# Patient Record
Sex: FEMALE | NOT HISPANIC OR LATINO | Employment: FULL TIME | URBAN - NONMETROPOLITAN AREA
[De-identification: names, ages, dates, MRNs, and addresses within clinical notes are randomized per-mention and may not be internally consistent; named-entity substitution may affect disease eponyms.]

---

## 2023-03-20 DIAGNOSIS — M79.7 FIBROMYALGIA: ICD-10-CM

## 2023-03-20 RX ORDER — DULOXETIN HYDROCHLORIDE 30 MG/1
30 CAPSULE, DELAYED RELEASE ORAL DAILY
Qty: 30 CAPSULE | Refills: 11 | Status: SHIPPED | OUTPATIENT
Start: 2023-03-20 | End: 2023-04-13

## 2023-03-20 RX ORDER — DULOXETIN HYDROCHLORIDE 60 MG/1
60 CAPSULE, DELAYED RELEASE ORAL DAILY
Qty: 30 CAPSULE | Refills: 11 | Status: SHIPPED | OUTPATIENT
Start: 2023-03-20 | End: 2023-04-13

## 2023-03-20 RX ORDER — BUPROPION HYDROCHLORIDE 300 MG/1
1 TABLET ORAL DAILY
COMMUNITY
End: 2023-03-20 | Stop reason: SDUPTHER

## 2023-03-20 RX ORDER — BUPROPION HYDROCHLORIDE 300 MG/1
300 TABLET ORAL DAILY
Qty: 30 TABLET | Refills: 11 | Status: SHIPPED | OUTPATIENT
Start: 2023-03-20 | End: 2023-04-13

## 2023-03-20 RX ORDER — DULOXETIN HYDROCHLORIDE 30 MG/1
30 CAPSULE, DELAYED RELEASE ORAL DAILY
COMMUNITY
End: 2023-03-20 | Stop reason: SDUPTHER

## 2023-03-20 RX ORDER — DULOXETIN HYDROCHLORIDE 60 MG/1
1 CAPSULE, DELAYED RELEASE ORAL DAILY
COMMUNITY
Start: 2022-01-17 | End: 2023-03-20 | Stop reason: SDUPTHER

## 2023-03-27 ENCOUNTER — OFFICE VISIT (OUTPATIENT)
Dept: PRIMARY CARE | Facility: CLINIC | Age: 38
End: 2023-03-27
Payer: COMMERCIAL

## 2023-03-27 VITALS
HEART RATE: 72 BPM | WEIGHT: 157 LBS | BODY MASS INDEX: 23.79 KG/M2 | SYSTOLIC BLOOD PRESSURE: 98 MMHG | DIASTOLIC BLOOD PRESSURE: 64 MMHG | HEIGHT: 68 IN

## 2023-03-27 DIAGNOSIS — R51.9 SINUS HEADACHE: Primary | ICD-10-CM

## 2023-03-27 DIAGNOSIS — G43.009 MIGRAINE WITHOUT AURA AND WITHOUT STATUS MIGRAINOSUS, NOT INTRACTABLE: ICD-10-CM

## 2023-03-27 DIAGNOSIS — Z51.81 MEDICATION MONITORING ENCOUNTER: ICD-10-CM

## 2023-03-27 LAB
AMPHETAMINE (PRESENCE) IN URINE BY SCREEN METHOD: ABNORMAL
BARBITURATES PRESENCE IN URINE BY SCREEN METHOD: ABNORMAL
BENZODIAZEPINE (PRESENCE) IN URINE BY SCREEN METHOD: ABNORMAL
CANNABINOIDS IN URINE BY SCREEN METHOD: ABNORMAL
COCAINE (PRESENCE) IN URINE BY SCREEN METHOD: ABNORMAL
DRUG SCREEN COMMENT URINE: ABNORMAL
FENTANYL URINE: ABNORMAL
METHADONE (PRESENCE) IN URINE BY SCREEN METHOD: ABNORMAL
OPIATES (PRESENCE) IN URINE BY SCREEN METHOD: ABNORMAL
OXYCODONE (PRESENCE) IN URINE BY SCREEN METHOD: ABNORMAL
PHENCYCLIDINE (PRESENCE) IN URINE BY SCREEN METHOD: ABNORMAL

## 2023-03-27 PROCEDURE — 80307 DRUG TEST PRSMV CHEM ANLYZR: CPT

## 2023-03-27 PROCEDURE — 80349 CANNABINOIDS NATURAL: CPT

## 2023-03-27 PROCEDURE — 99214 OFFICE O/P EST MOD 30 MIN: CPT | Performed by: FAMILY MEDICINE

## 2023-03-27 PROCEDURE — 80324 DRUG SCREEN AMPHETAMINES 1/2: CPT

## 2023-03-27 PROCEDURE — 1036F TOBACCO NON-USER: CPT | Performed by: FAMILY MEDICINE

## 2023-03-27 RX ORDER — CETIRIZINE HYDROCHLORIDE 10 MG/1
10 TABLET ORAL 2 TIMES DAILY
Qty: 60 TABLET | Refills: 0 | Status: SHIPPED | OUTPATIENT
Start: 2023-03-27 | End: 2023-05-08

## 2023-03-27 RX ORDER — FLUTICASONE PROPIONATE 50 MCG
1 SPRAY, SUSPENSION (ML) NASAL DAILY
Qty: 16 G | Refills: 12 | Status: SHIPPED | OUTPATIENT
Start: 2023-03-27 | End: 2023-07-20 | Stop reason: ALTCHOICE

## 2023-03-27 RX ORDER — MONTELUKAST SODIUM 10 MG/1
10 TABLET ORAL NIGHTLY
Qty: 30 TABLET | Refills: 3 | Status: SHIPPED | OUTPATIENT
Start: 2023-03-27 | End: 2023-07-20 | Stop reason: ALTCHOICE

## 2023-03-27 RX ORDER — SUMATRIPTAN SUCCINATE 100 MG/1
TABLET ORAL
COMMUNITY
End: 2023-05-03 | Stop reason: SDUPTHER

## 2023-03-27 RX ORDER — BUTALBITAL, ACETAMINOPHEN AND CAFFEINE 50; 325; 40 MG/1; MG/1; MG/1
1 TABLET ORAL EVERY 6 HOURS PRN
Qty: 20 TABLET | Refills: 0 | Status: SHIPPED | OUTPATIENT
Start: 2023-03-27 | End: 2023-04-01

## 2023-03-27 ASSESSMENT — PATIENT HEALTH QUESTIONNAIRE - PHQ9
8. MOVING OR SPEAKING SO SLOWLY THAT OTHER PEOPLE COULD HAVE NOTICED. OR THE OPPOSITE, BEING SO FIGETY OR RESTLESS THAT YOU HAVE BEEN MOVING AROUND A LOT MORE THAN USUAL: NEARLY EVERY DAY
6. FEELING BAD ABOUT YOURSELF - OR THAT YOU ARE A FAILURE OR HAVE LET YOURSELF OR YOUR FAMILY DOWN: NEARLY EVERY DAY
4. FEELING TIRED OR HAVING LITTLE ENERGY: NEARLY EVERY DAY
SUM OF ALL RESPONSES TO PHQ QUESTIONS 1-9: 21
SUM OF ALL RESPONSES TO PHQ9 QUESTIONS 1 AND 2: 6
5. POOR APPETITE OR OVEREATING: NEARLY EVERY DAY
7. TROUBLE CONCENTRATING ON THINGS, SUCH AS READING THE NEWSPAPER OR WATCHING TELEVISION: NEARLY EVERY DAY
9. THOUGHTS THAT YOU WOULD BE BETTER OFF DEAD, OR OF HURTING YOURSELF: NOT AT ALL
1. LITTLE INTEREST OR PLEASURE IN DOING THINGS: NEARLY EVERY DAY
2. FEELING DOWN, DEPRESSED OR HOPELESS: NEARLY EVERY DAY
10. IF YOU CHECKED OFF ANY PROBLEMS, HOW DIFFICULT HAVE THESE PROBLEMS MADE IT FOR YOU TO DO YOUR WORK, TAKE CARE OF THINGS AT HOME, OR GET ALONG WITH OTHER PEOPLE: EXTREMELY DIFFICULT
3. TROUBLE FALLING OR STAYING ASLEEP OR SLEEPING TOO MUCH: NOT AT ALL

## 2023-03-27 ASSESSMENT — ENCOUNTER SYMPTOMS
FEVER: 0
WHEEZING: 0
SINUS PRESSURE: 1
SHORTNESS OF BREATH: 1
COUGH: 1
SORE THROAT: 1
SINUS PAIN: 1
CHILLS: 1

## 2023-03-27 NOTE — PROGRESS NOTES
Subjective   Patient ID: Nuzhat Morris is a 37 y.o. female who presents for Sore Throat and Migraine.    HPI   ST went away 2 months ago   Cough x 3 days productive yellow   FARRELL will not quit    Now a migraine       X 2 days took imitrex helped a little bit  Sumatriptan takes half at time      9 per month and states not enough     Has seen neuology in [place]         no visual aura       But states has usual      Lightheadedness      Some photophobia      Some Phonophobia     Taking a decongestant feels like is making worse   In the last 2 days is progressive worse    No nose spray zyrtec for  3 days   Nasal congestion clear to yellow   Runny nose dry burning iritation  Eyes burning   Had pain bitemporal and sinus pressure   Benadryl 25 2 po at bedtime and makes sleepy   Zyrtec D but none today     Fioricet takes prn and last dose was 2 years ago     OARRS:  No data recorded  I have personally reviewed the OARRS report for Nuzhat Morris. I have considered the risks of abuse, dependence, addiction and diversion    Is the patient prescribed a combination of a benzodiazepine and opioid?  No    Last Urine Drug Screen / ordered today: Yes  Recent Results (from the past 03173 hour(s))   DRUG SCREEN,URINE    Collection Time: 08/29/21  6:44 PM   Result Value Ref Range    DRUG SCREEN COMMENT URINE SEE BELOW     Amphetamine Screen, Urine PRESUMPTIVE POSITIVE (A) NEGATIVE    Barbiturate Screen, Urine PRESUMPTIVE NEGATIVE NEGATIVE    BENZODIAZEPINE (PRESENCE) IN URINE BY SCREEN METHOD PRESUMPTIVE POSITIVE (A) NEGATIVE    Cannabinoid Screen, Urine PRESUMPTIVE POSITIVE (A) NEGATIVE    Cocaine Screen, Urine PRESUMPTIVE NEGATIVE NEGATIVE    Fentanyl, Ur PRESUMPTIVE NEGATIVE NEGATIVE    Methadone Screen, Urine PRESUMPTIVE NEGATIVE NEGATIVE    Opiate Screen, Urine PRESUMPTIVE NEGATIVE NEGATIVE    Oxycodone Screen, Ur PRESUMPTIVE NEGATIVE NEGATIVE    PCP Screen, Urine PRESUMPTIVE NEGATIVE NEGATIVE     Results are as  "expected.     Controlled Substance Agreement:  Date of the Last Agreement: 3/27/23  Reviewed Controlled Substance Agreement including but not limited to the benefits, risks, and alternatives to treatment with a Controlled Substance medication(s).    Barbiturates:   What is the patient's goal of therapy? To control HA   Is this being achieved with current treatment? Has not taken in 2 years   But did help     Activities of Daily Living:   Is your overall impression that this patient is benefiting (symptom reduction outweighs side effects) from barbiturate therapy? Yes     Explaine barbituates not drug of choice for migraines and will only give a limited amount and refer to neurology      Review of Systems   Constitutional:  Positive for chills. Negative for fever.   HENT:  Positive for sinus pressure, sinus pain, sneezing and sore throat.    Respiratory:  Positive for cough and shortness of breath. Negative for wheezing.        Objective   BP 98/64   Pulse 72   Ht 1.727 m (5' 8\")   Wt 71.2 kg (157 lb)   BMI 23.87 kg/m²     Physical Exam  Constitutional:       Appearance: Normal appearance. She is normal weight.   HENT:      Head: Normocephalic and atraumatic.      Right Ear: Tympanic membrane, ear canal and external ear normal. There is no impacted cerumen.      Left Ear: Tympanic membrane, ear canal and external ear normal. There is no impacted cerumen.      Nose: Congestion and rhinorrhea present.      Mouth/Throat:      Mouth: Mucous membranes are moist.      Pharynx: Oropharynx is clear. No oropharyngeal exudate or posterior oropharyngeal erythema.   Eyes:      Extraocular Movements: Extraocular movements intact.      Conjunctiva/sclera: Conjunctivae normal.      Pupils: Pupils are equal, round, and reactive to light.   Pulmonary:      Effort: Pulmonary effort is normal.      Breath sounds: Normal breath sounds.   Neurological:      General: No focal deficit present.      Mental Status: She is alert and " oriented to person, place, and time. Mental status is at baseline.   Psychiatric:         Mood and Affect: Mood normal.         Behavior: Behavior normal.         Assessment/Plan   Problem List Items Addressed This Visit    None  Visit Diagnoses       Sinus headache    -  Primary    Relevant Medications    butalbital-acetaminophen-caff -40 mg tablet    montelukast (Singulair) 10 mg tablet    cetirizine (ZyrTEC) 10 mg tablet    fluticasone (Flonase) 50 mcg/actuation nasal spray    Migraine without aura and without status migrainosus, not intractable        Relevant Medications    butalbital-acetaminophen-caff -40 mg tablet    Other Relevant Orders    Referral to Neurology    Medication monitoring encounter        Relevant Orders    Drug Screen, Urine With Reflex to Confirmation          I feel her HA is more sinus related and will treat as such however she is using her imitrex every month and needs a clear indication for when to use imitrex.

## 2023-03-31 LAB
11-NOR-9-CARBOXY-THC, URN, QUANT: >500 NG/ML
AMPHETAMINES,URINE: <50 NG/ML
MDA,URINE: <200 NG/ML
MDEA,URINE: <200 NG/ML
MDMA,UR: <200 NG/ML
METHAMPHETAMINE QUANTITATIVE URINE: <200 NG/ML
PHENTERMINE,UR: <200 NG/ML

## 2023-04-05 ENCOUNTER — TELEPHONE (OUTPATIENT)
Dept: PRIMARY CARE | Facility: CLINIC | Age: 38
End: 2023-04-05
Payer: COMMERCIAL

## 2023-04-05 DIAGNOSIS — R51.9 SINUS HEADACHE: Primary | ICD-10-CM

## 2023-04-05 NOTE — TELEPHONE ENCOUNTER
Stating that the medication that she has been taking for her allergies is not effective, please advise

## 2023-04-12 DIAGNOSIS — M79.7 FIBROMYALGIA: ICD-10-CM

## 2023-04-13 RX ORDER — BUPROPION HYDROCHLORIDE 300 MG/1
300 TABLET ORAL DAILY
Qty: 30 TABLET | Refills: 4 | Status: SHIPPED | OUTPATIENT
Start: 2023-04-13 | End: 2024-04-19 | Stop reason: SDUPTHER

## 2023-04-13 RX ORDER — DULOXETIN HYDROCHLORIDE 30 MG/1
CAPSULE, DELAYED RELEASE ORAL
Qty: 30 CAPSULE | Refills: 0 | Status: SHIPPED | OUTPATIENT
Start: 2023-04-13 | End: 2023-04-27

## 2023-04-13 RX ORDER — DULOXETIN HYDROCHLORIDE 60 MG/1
CAPSULE, DELAYED RELEASE ORAL
Qty: 30 CAPSULE | Refills: 11 | Status: SHIPPED | OUTPATIENT
Start: 2023-04-13 | End: 2024-01-03 | Stop reason: WASHOUT

## 2023-04-21 ENCOUNTER — APPOINTMENT (OUTPATIENT)
Dept: PRIMARY CARE | Facility: CLINIC | Age: 38
End: 2023-04-21
Payer: COMMERCIAL

## 2023-04-27 DIAGNOSIS — M79.7 FIBROMYALGIA: ICD-10-CM

## 2023-04-27 RX ORDER — DULOXETIN HYDROCHLORIDE 30 MG/1
CAPSULE, DELAYED RELEASE ORAL
Qty: 90 CAPSULE | Refills: 1 | Status: SHIPPED | OUTPATIENT
Start: 2023-04-27 | End: 2023-07-20 | Stop reason: ALTCHOICE

## 2023-04-27 NOTE — TELEPHONE ENCOUNTER
Called in asking for refills on Gabapentin and one other medication but she did not tell me what it was. Send to [facility] in [place] thank you

## 2023-05-03 ENCOUNTER — TELEPHONE (OUTPATIENT)
Dept: PRIMARY CARE | Facility: CLINIC | Age: 38
End: 2023-05-03
Payer: COMMERCIAL

## 2023-05-03 DIAGNOSIS — G43.009 MIGRAINE WITHOUT AURA AND WITHOUT STATUS MIGRAINOSUS, NOT INTRACTABLE: ICD-10-CM

## 2023-05-03 DIAGNOSIS — M79.7 FIBROMYALGIA: ICD-10-CM

## 2023-05-03 RX ORDER — GABAPENTIN 300 MG/1
CAPSULE ORAL
COMMUNITY
End: 2023-05-03 | Stop reason: SDUPTHER

## 2023-05-03 RX ORDER — GABAPENTIN 300 MG/1
300 CAPSULE ORAL 4 TIMES DAILY
Qty: 120 CAPSULE | Refills: 11 | Status: SHIPPED | OUTPATIENT
Start: 2023-05-03 | End: 2023-07-20

## 2023-05-03 RX ORDER — SUMATRIPTAN SUCCINATE 100 MG/1
TABLET ORAL
Qty: 9 TABLET | Refills: 3 | Status: SHIPPED | OUTPATIENT
Start: 2023-05-03 | End: 2023-10-24 | Stop reason: SDUPTHER

## 2023-05-08 DIAGNOSIS — R51.9 SINUS HEADACHE: ICD-10-CM

## 2023-05-08 RX ORDER — CETIRIZINE HYDROCHLORIDE 10 MG/1
TABLET ORAL
Qty: 60 TABLET | Refills: 0 | Status: SHIPPED | OUTPATIENT
Start: 2023-05-08 | End: 2023-07-20 | Stop reason: ALTCHOICE

## 2023-05-24 RX ORDER — BUSPIRONE HYDROCHLORIDE 15 MG/1
TABLET ORAL
Qty: 90 TABLET | Refills: 1 | OUTPATIENT
Start: 2023-05-24

## 2023-06-29 RX ORDER — PRAZOSIN HYDROCHLORIDE 2 MG/1
4 CAPSULE ORAL NIGHTLY
Qty: 60 CAPSULE | Refills: 11 | Status: CANCELLED | OUTPATIENT
Start: 2023-06-29 | End: 2024-06-28

## 2023-06-29 RX ORDER — PRAZOSIN HYDROCHLORIDE 2 MG/1
2 CAPSULE ORAL NIGHTLY
COMMUNITY
Start: 2019-01-10 | End: 2023-07-20 | Stop reason: ALTCHOICE

## 2023-06-29 NOTE — TELEPHONE ENCOUNTER
I don't see that we have rx this before.  Ask when she last took it and who rx it.  Why is she taking it.?

## 2023-07-06 ENCOUNTER — TELEPHONE (OUTPATIENT)
Dept: PRIMARY CARE | Facility: CLINIC | Age: 38
End: 2023-07-06
Payer: COMMERCIAL

## 2023-07-06 NOTE — TELEPHONE ENCOUNTER
She got the message about not passing the drug test. She has a medical marijuana license and the medication she is requesting was filled in March. Bringing that up or testing her for that is discrimination. Medical marijuana is not a drug. Would like to switch doctors immately. It's discrimination you can't tell her she can't smoke weed. She has anxiety issues and Dr. Hackett treats it. Asking for a call back to switch doctors.     Stating when she had the drug test in March it did show marijuana and a doctor prescribed it. Withholding medication from her that has already been prescribed is not an ethical practice and she is going report it to the state medical board. She filled out the form and is going to file a complaint.

## 2023-07-20 ENCOUNTER — OFFICE VISIT (OUTPATIENT)
Dept: PRIMARY CARE | Facility: CLINIC | Age: 38
End: 2023-07-20
Payer: COMMERCIAL

## 2023-07-20 VITALS
WEIGHT: 152.5 LBS | SYSTOLIC BLOOD PRESSURE: 119 MMHG | OXYGEN SATURATION: 98 % | DIASTOLIC BLOOD PRESSURE: 76 MMHG | HEART RATE: 98 BPM | BODY MASS INDEX: 23.11 KG/M2 | TEMPERATURE: 98 F | HEIGHT: 68 IN

## 2023-07-20 DIAGNOSIS — N30.10 INTERSTITIAL CYSTITIS: ICD-10-CM

## 2023-07-20 DIAGNOSIS — G43.009 MIGRAINE WITHOUT AURA AND WITHOUT STATUS MIGRAINOSUS, NOT INTRACTABLE: ICD-10-CM

## 2023-07-20 DIAGNOSIS — F41.0 PANIC ATTACK: ICD-10-CM

## 2023-07-20 DIAGNOSIS — F51.04 PSYCHOPHYSIOLOGICAL INSOMNIA: ICD-10-CM

## 2023-07-20 DIAGNOSIS — F41.8 MIXED ANXIETY AND DEPRESSIVE DISORDER: Primary | ICD-10-CM

## 2023-07-20 DIAGNOSIS — M79.7 FIBROMYALGIA: ICD-10-CM

## 2023-07-20 PROCEDURE — 99214 OFFICE O/P EST MOD 30 MIN: CPT | Performed by: PHYSICIAN ASSISTANT

## 2023-07-20 PROCEDURE — 1036F TOBACCO NON-USER: CPT | Performed by: PHYSICIAN ASSISTANT

## 2023-07-20 RX ORDER — GABAPENTIN 600 MG/1
600 TABLET ORAL 3 TIMES DAILY
Qty: 90 TABLET | Refills: 11 | Status: SHIPPED | OUTPATIENT
Start: 2023-07-20 | End: 2024-01-03 | Stop reason: ALTCHOICE

## 2023-07-20 RX ORDER — TRAZODONE HYDROCHLORIDE 50 MG/1
50 TABLET ORAL NIGHTLY PRN
Qty: 30 TABLET | Refills: 5 | Status: SHIPPED | OUTPATIENT
Start: 2023-07-20 | End: 2023-08-14

## 2023-07-20 RX ORDER — METHENAMINE, BENZOIC ACID, PHENYL SALICYLATE, METHYLENE BLUE, AND HYOSCYAMINE SULFATE 81.6; 9; 36.2; 10.8; .12 MG/1; MG/1; MG/1; MG/1; MG/1
TABLET ORAL
COMMUNITY
End: 2023-09-06 | Stop reason: SDUPTHER

## 2023-07-20 RX ORDER — BUTALBITAL, ACETAMINOPHEN AND CAFFEINE 300; 40; 50 MG/1; MG/1; MG/1
1 CAPSULE ORAL EVERY 4 HOURS PRN
Qty: 30 CAPSULE | Refills: 1 | Status: SHIPPED | OUTPATIENT
Start: 2023-07-20 | End: 2023-10-24 | Stop reason: SDUPTHER

## 2023-07-20 RX ORDER — LORAZEPAM 1 MG/1
1 TABLET ORAL EVERY 8 HOURS PRN
Qty: 21 TABLET | Refills: 0 | Status: SHIPPED | OUTPATIENT
Start: 2023-07-20 | End: 2023-08-28 | Stop reason: SDUPTHER

## 2023-07-20 RX ORDER — BUTALBITAL, ACETAMINOPHEN AND CAFFEINE 300; 40; 50 MG/1; MG/1; MG/1
1 CAPSULE ORAL EVERY 4 HOURS PRN
COMMUNITY
Start: 2019-02-04 | End: 2023-07-20 | Stop reason: SDUPTHER

## 2023-07-20 ASSESSMENT — PATIENT HEALTH QUESTIONNAIRE - PHQ9
2. FEELING DOWN, DEPRESSED OR HOPELESS: NEARLY EVERY DAY
SUM OF ALL RESPONSES TO PHQ9 QUESTIONS 1 AND 2: 6
1. LITTLE INTEREST OR PLEASURE IN DOING THINGS: NEARLY EVERY DAY

## 2023-07-20 NOTE — PROGRESS NOTES
Subjective   Patient ID: Nuzhat Quinn is a 38 y.o. female who presents for Establish Care (Transferring from [facility] and last seen by PCP x 1 month./PAP done 2019.), Panic Attack (Patient having episodes of panic attacks that have been waking her up from her sleep at night and has had to go to ER at one time.), and Fibromyalgia (Patient would like to discuss increasing Gabapentin as she has been having increase discomfort of shoulders, knees, elbows and lower back.).  HPI  Patient presents to \[facility]\"" care.    Patient has medical history of fibromyalgia, interstitial cystitis, anxiety and depression, panic attacks, insomnia, migraines.  Currently, the patient takes Imitrex as needed, Hyophen, gabapentin, Cymbalta, medical marijuana (patient has a card), and Wellbutrin in management of these.    The patient is going through an extended and complicated divorce.  Patient reports increase in anxiety and panic attacks.  Patient reports waking up in the night sweating with tachycardia, chest pressure, and great fear.  Patient reports lying on the floor in the bathroom crying trying to muscle through these.  Patient was seen and treated in the ER not long ago for the same.  Patient states that the panic attacks have become more frequent recently but can go months without occurring.  Patient is having difficulty falling asleep in fear that the panic attack will occur in the night causing her to wake up in great fear.  Additionally, the patient reports increase in extremity and low back pain with intermittent spasms.  The patient is requesting possible increase in gabapentin and a PRN panic attack option.  The patient has been taking the same dose of gabapentin since approximately 2017 after being diagnosed with fibromyalgia by previous primary care.    Acutely, patient has no other complaints.  Patient needs refill on the Fioricet.  Review of Systems    Constitutional:  See HPI   Eye:  No recent  "visual problem.    Respiratory:  No shortness of breath, No cough.    Cardiovascular:  No chest pain.    Gastrointestinal:  No abdominal pain, No nausea, No vomiting.    Genitourinary:  No dysuria, No hematuria.    Musculoskeletal:  No decreased range of motion.    Integumentary:  No rash.    Neurologic:  Alert and oriented X4, No numbness, No tingling.    All other systems are negative     Objective     /76   Pulse 98   Temp 36.7 °C (98 °F) (Temporal)   Ht 1.727 m (5' 8\")   Wt 69.2 kg (152 lb 8 oz)   SpO2 98%   BMI 23.19 kg/m²     Physical Exam    General:  Alert and oriented, No acute distress.    Eye:  Pupils are equal, round and reactive to light, Extraocular movements are intact, Normal conjunctiva.    HENT:  Normocephalic, Normal hearing, Oral mucosa is moist, No pharyngeal erythema, No sinus tenderness.    Neck:  Supple, Non-tender, No lymphadenopathy.    Respiratory:  Lungs are clear to auscultation, Respirations are non-labored, Breath sounds are equal    Cardiovascular:  Normal rate, Regular rhythm.    Gastrointestinal:  Non-distended.    Musculoskeletal: Normal range of motion, Normal strength, No tenderness, No swelling, No deformity, Normal gait.     Integumentary:  Warm, Dry, Intact, No pallor, No rash.    Neurologic:  Alert, Oriented, Normal sensory, Normal motor function, No focal deficits, Cranial Nerves II-XII are grossly intact   Psychiatric:  Cooperative, Appropriate mood & affect.    Assessment/Plan   Anxiety and depression with panic attacks: Continue Cymbalta and Wellbutrin.  Added short course of Ativan as needed.  OARRS reviewed, appropriate, and consitent with provided history.  Patient advised that should Ativan be needed moving forward, we will need to obtain UDS and CSA.  Also, the addition of trazodone for insomnia will help with this.    Insomnia: Trazodone nightly as needed insomnia    Migraines: Continue Imitrex and Fioricet as needed, Fioricet refilled    Interstitial " cystitis: Continue Hyophen and following with urology as previously planned    Fibromyalgia: We will increase gabapentin to 600 mg 3 times daily.    Health maintenance: Screening labs ordered.  Further recommendations pending results.    Follow-up in 6 months or as needed.    Problem List Items Addressed This Visit    None  Visit Diagnoses       Mixed anxiety and depressive disorder    -  Primary    Relevant Medications    LORazepam (Ativan) 1 mg tablet    Other Relevant Orders    Basic Metabolic Panel    CBC    Lipid Panel    TSH with reflex to Free T4 if abnormal    Vitamin D, Total    Hepatic Function Panel    Panic attack        Relevant Medications    LORazepam (Ativan) 1 mg tablet    Other Relevant Orders    Basic Metabolic Panel    CBC    Lipid Panel    TSH with reflex to Free T4 if abnormal    Vitamin D, Total    Hepatic Function Panel    Psychophysiological insomnia        Relevant Medications    traZODone (Desyrel) 50 mg tablet    Other Relevant Orders    Basic Metabolic Panel    CBC    Lipid Panel    TSH with reflex to Free T4 if abnormal    Vitamin D, Total    Hepatic Function Panel    Migraine without aura and without status migrainosus, not intractable        Relevant Medications    butalbital-acetaminophen-caff (Fioricet) -40 mg capsule    Other Relevant Orders    Basic Metabolic Panel    CBC    Lipid Panel    TSH with reflex to Free T4 if abnormal    Vitamin D, Total    Hepatic Function Panel    Fibromyalgia        Relevant Medications    gabapentin (Neurontin) 600 mg tablet    Interstitial cystitis                Final diagnoses:   [F41.8] Mixed anxiety and depressive disorder   [F41.0] Panic attack   [F51.04] Psychophysiological insomnia   [G43.009] Migraine without aura and without status migrainosus, not intractable   [M79.7] Fibromyalgia   [N30.10] Interstitial cystitis

## 2023-08-11 DIAGNOSIS — F51.04 PSYCHOPHYSIOLOGICAL INSOMNIA: ICD-10-CM

## 2023-08-14 RX ORDER — TRAZODONE HYDROCHLORIDE 50 MG/1
50 TABLET ORAL NIGHTLY PRN
Qty: 30 TABLET | Refills: 5 | Status: SHIPPED | OUTPATIENT
Start: 2023-08-14 | End: 2024-01-03 | Stop reason: WASHOUT

## 2023-08-15 ENCOUNTER — APPOINTMENT (OUTPATIENT)
Dept: PRIMARY CARE | Facility: CLINIC | Age: 38
End: 2023-08-15
Payer: COMMERCIAL

## 2023-08-28 ENCOUNTER — OFFICE VISIT (OUTPATIENT)
Dept: PRIMARY CARE | Facility: CLINIC | Age: 38
End: 2023-08-28
Payer: COMMERCIAL

## 2023-08-28 VITALS
HEART RATE: 77 BPM | HEIGHT: 68 IN | WEIGHT: 154.5 LBS | SYSTOLIC BLOOD PRESSURE: 131 MMHG | TEMPERATURE: 98.7 F | BODY MASS INDEX: 23.42 KG/M2 | DIASTOLIC BLOOD PRESSURE: 82 MMHG

## 2023-08-28 DIAGNOSIS — F41.0 PANIC ATTACK: ICD-10-CM

## 2023-08-28 DIAGNOSIS — F41.8 MIXED ANXIETY AND DEPRESSIVE DISORDER: ICD-10-CM

## 2023-08-28 PROCEDURE — 99214 OFFICE O/P EST MOD 30 MIN: CPT | Performed by: PHYSICIAN ASSISTANT

## 2023-08-28 PROCEDURE — 1036F TOBACCO NON-USER: CPT | Performed by: PHYSICIAN ASSISTANT

## 2023-08-28 RX ORDER — MIRTAZAPINE 15 MG/1
TABLET, FILM COATED ORAL
Qty: 90 TABLET | Refills: 3 | Status: SHIPPED | OUTPATIENT
Start: 2023-08-28 | End: 2023-10-16 | Stop reason: ALTCHOICE

## 2023-08-28 RX ORDER — TRIMETHOPRIM 100 MG/1
TABLET ORAL
COMMUNITY
Start: 2022-12-02

## 2023-08-28 RX ORDER — LORAZEPAM 1 MG/1
1 TABLET ORAL EVERY 8 HOURS PRN
Qty: 30 TABLET | Refills: 2 | Status: SHIPPED | OUTPATIENT
Start: 2023-08-28 | End: 2023-10-24 | Stop reason: SDUPTHER

## 2023-08-28 RX ORDER — LORAZEPAM 1 MG/1
1 TABLET ORAL EVERY 8 HOURS PRN
Qty: 21 TABLET | Refills: 0 | Status: CANCELLED | OUTPATIENT
Start: 2023-08-28

## 2023-08-28 NOTE — PROGRESS NOTES
"Subjective   Patient ID: Nuzhat Delgado is a 38 y.o. female who presents for Leg Swelling (Lower extremity swelling x 3-4 days and states having facial swelling but has been crying. /), Abdominal Pain (Patient states was seen at [facility] ER in [place] x 2 weeks dx with ovary cyst.  Patient has had no follow up and states having left upper abdomen pain.), and Earache (Right ear discomfort x 2 days with drainage).  HPI    Patient presents in follow-up of anxiety and depression.  Patient has had persistent panic attacks since last visit.  Patient was in the ER for same not long ago.  Patient reports crying all day, panic attacks, and depression that is worsening.  Patient reports more complicated home life stressors related to family, custody of children.  Patient states that trazodone has worked for insomnia.    Patient reports on and off left otalgia for the past several weeks.  No preceding upper respiratory infection.  Patient denies known seasonal allergies.      Patient does continue to follow with [facility].    Review of Systems    Constitutional:  See HPI   Eye:  No recent visual problem.    ENT: See HPI  Psychiatric: See HPI.    Neurologic:  Alert and oriented X4, No numbness, No tingling.    All other systems are negative     Objective     /82   Pulse 77   Temp 37.1 °C (98.7 °F) (Temporal)   Ht 1.727 m (5' 8\")   Wt 70.1 kg (154 lb 8 oz)   BMI 23.49 kg/m²     Physical Exam    General:  Alert and oriented, No acute distress.    Eye:  Pupils are equal, round and reactive to light, Normal conjunctiva.    HENT:  Normocephalic, bilateral tympanic membranes and canals are unremarkable  Neck:  Supple    Respiratory: Respirations are non-labored   Musculoskeletal: Normal ROM and strength  Integumentary:  Warm, Dry, Intact, No pallor, No rash.    Neurologic:  Alert, Oriented, Normal sensory, Cranial Nerves II-XII are grossly intact  Psychiatric: Sad, tearful    Assessment/Plan "   Anxiety, depression, and panic attacks: Add Remeron 15 mg at upward titrating dose.  Ativan refilled for 1 month.  CSA signed and UDS obtained.  Follow-up 1 month patient is encouraged to follow-up anytime simply to talk, if needed.  Patient is ultimately alone.  Patient's family is scattered from [place] to [place].  Patient is in the midst of a divorce and custody castaneda.    Ear pain is likely from excessive crying and subsequent chronic congestion.  Recommend decongestant and/or Flonase.    ER results reviewed.  Patient has a history of physiologic ovarian cysts.      Problem List Items Addressed This Visit    None  Visit Diagnoses       Mixed anxiety and depressive disorder        Relevant Medications    mirtazapine (Remeron) 15 mg tablet    LORazepam (Ativan) 1 mg tablet    Panic attack        Relevant Medications    mirtazapine (Remeron) 15 mg tablet    LORazepam (Ativan) 1 mg tablet            Final diagnoses:   [F41.8] Mixed anxiety and depressive disorder   [F41.0] Panic attack

## 2023-09-06 ENCOUNTER — TELEPHONE (OUTPATIENT)
Dept: PRIMARY CARE | Facility: CLINIC | Age: 38
End: 2023-09-06
Payer: COMMERCIAL

## 2023-09-06 DIAGNOSIS — N30.10 INTERSTITIAL CYSTITIS: Primary | ICD-10-CM

## 2023-09-06 RX ORDER — URINARY ANTISEPTIC ANTISPASMODIC 81.6; 40.8; 10.8; .12 MG/1; MG/1; MG/1; MG/1
1 TABLET ORAL 4 TIMES DAILY PRN
Qty: 30 TABLET | Refills: 3 | Status: SHIPPED | OUTPATIENT
Start: 2023-09-06 | End: 2024-01-03 | Stop reason: WASHOUT

## 2023-09-06 RX ORDER — METHENAMINE, BENZOIC ACID, PHENYL SALICYLATE, METHYLENE BLUE, AND HYOSCYAMINE SULFATE 81.6; 9; 36.2; 10.8; .12 MG/1; MG/1; MG/1; MG/1; MG/1
1 TABLET ORAL 3 TIMES DAILY PRN
Qty: 90 TABLET | Refills: 3 | Status: SHIPPED | OUTPATIENT
Start: 2023-09-06

## 2023-09-06 NOTE — TELEPHONE ENCOUNTER
[facility] pharmacy called stating Hyophen is no longer available and replaced with generic Urogesic.   Please Escribe new prescription.

## 2023-09-11 ENCOUNTER — TELEPHONE (OUTPATIENT)
Dept: PRIMARY CARE | Facility: CLINIC | Age: 38
End: 2023-09-11
Payer: COMMERCIAL

## 2023-09-11 NOTE — TELEPHONE ENCOUNTER
Called patient, no answer, left message in voice mail stating the Spravato nasal spray is not medication he would write and he can refer her to psychiatry if that is something she is interested in, also he is still up-titrating the Remeron and have several other choices if needed.  Prabhakar also stated if she wants to come in to be seen she may call the office and schedule an appt if she gets really bad then proceed to ER.  Patient to call the office if has further questions.

## 2023-09-11 NOTE — TELEPHONE ENCOUNTER
Patient called office states saw commercial regarding Spravato Nasal Spray to help treat major depression.  Patient questions if this is something that would benefit her as her depression has been really bad to where she is crying all the time and having suicidal ideation

## 2023-09-18 ENCOUNTER — OFFICE VISIT (OUTPATIENT)
Dept: PRIMARY CARE | Facility: CLINIC | Age: 38
End: 2023-09-18
Payer: COMMERCIAL

## 2023-09-18 VITALS
BODY MASS INDEX: 23.81 KG/M2 | OXYGEN SATURATION: 94 % | SYSTOLIC BLOOD PRESSURE: 120 MMHG | HEART RATE: 122 BPM | HEIGHT: 68 IN | WEIGHT: 157.1 LBS | TEMPERATURE: 98.7 F | DIASTOLIC BLOOD PRESSURE: 79 MMHG

## 2023-09-18 DIAGNOSIS — M79.7 FIBROMYALGIA: ICD-10-CM

## 2023-09-18 DIAGNOSIS — F41.8 MIXED ANXIETY AND DEPRESSIVE DISORDER: ICD-10-CM

## 2023-09-18 DIAGNOSIS — F41.0 PANIC ATTACK: ICD-10-CM

## 2023-09-18 DIAGNOSIS — L02.31 GLUTEAL ABSCESS: Primary | ICD-10-CM

## 2023-09-18 PROCEDURE — 99214 OFFICE O/P EST MOD 30 MIN: CPT | Performed by: PHYSICIAN ASSISTANT

## 2023-09-18 PROCEDURE — 1036F TOBACCO NON-USER: CPT | Performed by: PHYSICIAN ASSISTANT

## 2023-09-18 RX ORDER — DOXYCYCLINE 100 MG/1
100 CAPSULE ORAL 2 TIMES DAILY
Qty: 14 CAPSULE | Refills: 0 | Status: SHIPPED | OUTPATIENT
Start: 2023-09-18 | End: 2023-09-28 | Stop reason: ALTCHOICE

## 2023-09-18 NOTE — LETTER
September 18, 2023     Patient: Nuzhat Delgado   YOB: 1985   Date of Visit: 9/18/2023       To Whom It May Concern:    Nuzhat Delgado was seen in my clinic on 9/18/2023 at 11:50 am. Please excuse Nuzhat for her absence from work on this day to make the appointment.    If you have any questions or concerns, please don't hesitate to call.         Sincerely,         Prabhakar Zapien PA-C        CC: No Recipients

## 2023-09-18 NOTE — PROGRESS NOTES
"Subjective   Patient ID: Nuzhat Delgado is a 38 y.o. female who presents for Fever (Patient having temperature of 103.0 all weekend  and taking OTC Tylenol.), Abscess (Patient noticed 2 abscess on her buttocks x 4 days, with swelling and warmth to the touch.), and Anxiety (Discuss referral to [place] [id number] for her anxiety and depression.).  HPI  Pt presents in follow up for anxiety and depression treatment with remeron.  Pt reports very good results and feels considerably better.      Pt also c/o possible gluteal abscess.  Pt reports rapid progression of pain, swelling, and warmth on the (L) inferior gluteus and (R) medial gluteus.  No bleeding or drainage.  Pt feels poorly constitutionally.      Review of Systems    Constitutional:  See HPI   Integumentary: See HPI  Neurologic:  Alert and oriented X4, No numbness, No tingling.    All other systems are negative     Objective     /79   Pulse (!) 122   Temp 37.1 °C (98.7 °F)   Ht 1.727 m (5' 8\")   Wt 71.3 kg (157 lb 1.6 oz)   SpO2 94%   BMI 23.89 kg/m²     Physical Exam    General:  Alert and oriented, No acute distress.    Eye:  Pupils are equal, round and reactive to light, Normal conjunctiva.    HENT:  Normocephalic,   Neck:  Supple    Respiratory: Respirations are non-labored   Musculoskeletal: Normal ROM and strength  Integumentary: Gluteal exam performed with female MA chaperone present.  Right medial gluteus and the left inferior gluteus each with 4 to 5 cm diameter areas of erythema, warmth, and induration; no fluctuance in either  Neurologic:  Alert, Oriented, Normal sensory, Cranial Nerves II-XII are grossly intact  Psychiatric:  Cooperative, Appropriate mood & affect.    Assessment/Plan   Abscess x2 without fluctuance: Prescription for doxycycline.  Possible progression to need for I&D reviewed.  Signs and symptoms of this reviewed.    Anxiety/depression/panic attack: Continue current regimen of Wellbutrin, Cymbalta, Remeron, and Ativan as " needed.  Referrals to psychiatry, patient declined.    Follow-up in 6 months or as needed.  Problem List Items Addressed This Visit    None  Visit Diagnoses       Gluteal abscess    -  Primary    Relevant Medications    doxycycline (Vibramycin) 100 mg capsule    Fibromyalgia                Final diagnoses:   [M79.7] Fibromyalgia   [L02.31] Gluteal abscess

## 2023-09-25 ENCOUNTER — TELEPHONE (OUTPATIENT)
Dept: PRIMARY CARE | Facility: CLINIC | Age: 38
End: 2023-09-25
Payer: COMMERCIAL

## 2023-09-25 DIAGNOSIS — F41.8 MIXED ANXIETY AND DEPRESSIVE DISORDER: Primary | ICD-10-CM

## 2023-09-25 RX ORDER — DULOXETIN HYDROCHLORIDE 30 MG/1
30 CAPSULE, DELAYED RELEASE ORAL DAILY
Qty: 30 CAPSULE | Refills: 11 | Status: SHIPPED | OUTPATIENT
Start: 2023-09-25 | End: 2024-01-03 | Stop reason: SDUPTHER

## 2023-09-25 NOTE — TELEPHONE ENCOUNTER
Patient called the office stating the buttocks abscess are ready to be drained, do you want appt made.  Patient also wanted to double check if she is on the right dose of the Cymbalta, states was on 90 mg but now on 60 mg.

## 2023-09-25 NOTE — TELEPHONE ENCOUNTER
Called patient, she will  the 30 mg Cymbalta and we will see her Thursday at her appt with added I&D procedure.

## 2023-09-28 ENCOUNTER — OFFICE VISIT (OUTPATIENT)
Dept: PRIMARY CARE | Facility: CLINIC | Age: 38
End: 2023-09-28
Payer: COMMERCIAL

## 2023-09-28 VITALS
DIASTOLIC BLOOD PRESSURE: 83 MMHG | WEIGHT: 156.6 LBS | HEIGHT: 68 IN | TEMPERATURE: 98 F | SYSTOLIC BLOOD PRESSURE: 118 MMHG | BODY MASS INDEX: 23.73 KG/M2 | HEART RATE: 69 BPM

## 2023-09-28 DIAGNOSIS — L02.31 GLUTEAL ABSCESS: Primary | ICD-10-CM

## 2023-09-28 PROCEDURE — 99214 OFFICE O/P EST MOD 30 MIN: CPT | Performed by: PHYSICIAN ASSISTANT

## 2023-09-28 PROCEDURE — 10060 I&D ABSCESS SIMPLE/SINGLE: CPT | Performed by: PHYSICIAN ASSISTANT

## 2023-09-28 PROCEDURE — 1036F TOBACCO NON-USER: CPT | Performed by: PHYSICIAN ASSISTANT

## 2023-09-28 RX ORDER — DOXYCYCLINE HYCLATE 100 MG
100 TABLET ORAL 2 TIMES DAILY
Qty: 10 TABLET | Refills: 0 | Status: SHIPPED | OUTPATIENT
Start: 2023-09-28 | End: 2023-10-03

## 2023-09-28 NOTE — PATIENT INSTRUCTIONS
Keep packing in place for 72 hours and then remove.  Keep wound covered until healed.  Every day, remove the dressing, shower, cleanse with soap and water, and then recover until it heals.  Should be closed within a week.

## 2023-09-28 NOTE — PROGRESS NOTES
"Subjective   Patient ID: Nuzhat Delgado is a 38 y.o. female who presents for Procedure (Patient having continued abscess discomfort with hard to the touch with redness. Patient finished course of antibiotic.) and Follow-up (FU 1 month, patient states doing well taking Ativan and helps with the anxiety and depression.).  HPI  Patient presents for reevaluation of gluteal abscesses x2.  Patient was seen and treated here a week ago for same.  At that time, they were indurated but not fluctuant.  Patient reports expressing purulent discharge over the past several days but is unable to completely empty the abscess on the right medial gluteus.  Doxycycline greatly improved the symptoms.  No other complaints.  Review of Systems    Constitutional:  See HPI     Integumentary: See HPI  Neurologic:  Alert and oriented X4, No numbness, No tingling.    All other systems are negative     Objective     /83   Pulse 69   Temp 36.7 °C (98 °F) (Temporal)   Ht 1.727 m (5' 8\")   Wt 71 kg (156 lb 9.6 oz)   BMI 23.81 kg/m²     Physical Exam    General:  Alert and oriented, No acute distress.    Eye:  Pupils are equal, round and reactive to light, Normal conjunctiva.    HENT:  Normocephalic,   Neck:  Supple    Respiratory: Respirations are non-labored   Musculoskeletal: Normal ROM and strength  Integumentary: Abscess on the left inferior gluteus is resolving; there is no more erythema, warmth, or tenderness; abscess in the right medial gluteus with persistent erythema, tenderness, and small central fluctuant area  Neurologic:  Alert, Oriented, Normal sensory, Cranial Nerves II-XII are grossly intact  Psychiatric:  Cooperative, Appropriate mood & affect.    Incision and Drainage    Date/Time: 9/28/2023 11:54 AM    Performed by: Prabhakar Zapien PA-C  Authorized by: Prabhakar Zapien PA-C    Consent:     Consent obtained:  Verbal    Consent given by:  Patient    Risks discussed:  Bleeding and pain  Universal protocol:     " Patient identity confirmed:  Verbally with patient  Location:     Type:  Abscess    Location: Right medial gluteus.  Anesthesia:     Anesthesia method:  Local infiltration    Local anesthetic:  Lidocaine 2% WITH epi  Procedure type:     Complexity:  Simple  Procedure details:     Incision types:  Cruciate    Wound management:  Probed and deloculated and extensive cleaning    Drainage:  Bloody, purulent and serosanguinous    Drainage amount:  Scant    Wound treatment:  Wound left open    Packing materials:  1/4 in iodoform gauze  Post-procedure details:     Procedure completion:  Tolerated well, no immediate complications      Assessment/Plan   Right gluteus: I&D revealed old blood/hematoma with purulent fluid deep to it.  Wound was cleansed and packed.  Wound care reviewed written on discharge instructions.  5 more days of doxycycline was written.  Follow-up as scheduled or as needed.  Problem List Items Addressed This Visit    None  Visit Diagnoses       Gluteal abscess    -  Primary            Final diagnoses:   [L02.31] Gluteal abscess

## 2023-10-01 ENCOUNTER — PHARMACY VISIT (OUTPATIENT)
Dept: PHARMACY | Facility: CLINIC | Age: 38
End: 2023-10-01

## 2023-10-02 ENCOUNTER — PHARMACY VISIT (OUTPATIENT)
Dept: PHARMACY | Facility: CLINIC | Age: 38
End: 2023-10-02
Payer: MEDICAID

## 2023-10-02 PROCEDURE — RXMED WILLOW AMBULATORY MEDICATION CHARGE

## 2023-10-16 ENCOUNTER — PHARMACY VISIT (OUTPATIENT)
Dept: PHARMACY | Facility: CLINIC | Age: 38
End: 2023-10-16
Payer: MEDICAID

## 2023-10-16 ENCOUNTER — TELEPHONE (OUTPATIENT)
Dept: PRIMARY CARE | Facility: CLINIC | Age: 38
End: 2023-10-16
Payer: COMMERCIAL

## 2023-10-16 PROCEDURE — RXMED WILLOW AMBULATORY MEDICATION CHARGE

## 2023-10-16 RX ORDER — MIRTAZAPINE 45 MG/1
45 TABLET, FILM COATED ORAL NIGHTLY
Qty: 30 TABLET | Refills: 11 | Status: SHIPPED | OUTPATIENT
Start: 2023-10-16 | End: 2024-01-18 | Stop reason: SDUPTHER

## 2023-10-16 NOTE — TELEPHONE ENCOUNTER
Patient called the office states would like to start on Altreno for acne and states was going to get online but they are requiring a paid consultation.    Questions if this is something you would prescribe for her or if you would want her to make an appt with dermatology.

## 2023-10-17 NOTE — TELEPHONE ENCOUNTER
Called patient, no answer, left message in voicemail to call office to schedule appt regarding the Altreno medication for acne.

## 2023-10-24 ENCOUNTER — PHARMACY VISIT (OUTPATIENT)
Dept: PHARMACY | Facility: CLINIC | Age: 38
End: 2023-10-24
Payer: MEDICAID

## 2023-10-24 ENCOUNTER — OFFICE VISIT (OUTPATIENT)
Dept: PRIMARY CARE | Facility: CLINIC | Age: 38
End: 2023-10-24
Payer: COMMERCIAL

## 2023-10-24 VITALS
DIASTOLIC BLOOD PRESSURE: 80 MMHG | BODY MASS INDEX: 25.31 KG/M2 | SYSTOLIC BLOOD PRESSURE: 126 MMHG | HEART RATE: 101 BPM | HEIGHT: 68 IN | WEIGHT: 167 LBS

## 2023-10-24 DIAGNOSIS — F41.0 PANIC ATTACK: ICD-10-CM

## 2023-10-24 DIAGNOSIS — F41.8 MIXED ANXIETY AND DEPRESSIVE DISORDER: ICD-10-CM

## 2023-10-24 DIAGNOSIS — G43.009 MIGRAINE WITHOUT AURA AND WITHOUT STATUS MIGRAINOSUS, NOT INTRACTABLE: ICD-10-CM

## 2023-10-24 DIAGNOSIS — L70.9 ACNE, UNSPECIFIED ACNE TYPE: Primary | ICD-10-CM

## 2023-10-24 PROCEDURE — 99214 OFFICE O/P EST MOD 30 MIN: CPT | Performed by: PHYSICIAN ASSISTANT

## 2023-10-24 PROCEDURE — RXMED WILLOW AMBULATORY MEDICATION CHARGE

## 2023-10-24 PROCEDURE — 1036F TOBACCO NON-USER: CPT | Performed by: PHYSICIAN ASSISTANT

## 2023-10-24 RX ORDER — SUMATRIPTAN SUCCINATE 100 MG/1
TABLET ORAL
Qty: 9 TABLET | Refills: 3 | Status: SHIPPED | OUTPATIENT
Start: 2023-10-24 | End: 2024-05-15 | Stop reason: SDUPTHER

## 2023-10-24 RX ORDER — TRETINOIN 0.5 MG/G
1 LOTION TOPICAL DAILY
Qty: 45 G | Refills: 3 | Status: SHIPPED | OUTPATIENT
Start: 2023-10-24

## 2023-10-24 RX ORDER — BUTALBITAL, ACETAMINOPHEN AND CAFFEINE 50; 325; 40 MG/1; MG/1; MG/1
1 TABLET ORAL EVERY 4 HOURS PRN
Qty: 30 TABLET | Refills: 1 | Status: SHIPPED | OUTPATIENT
Start: 2023-10-24 | End: 2024-01-03 | Stop reason: SDUPTHER

## 2023-10-24 RX ORDER — LORAZEPAM 1 MG/1
1 TABLET ORAL EVERY 8 HOURS PRN
Qty: 60 TABLET | Refills: 2 | Status: SHIPPED | OUTPATIENT
Start: 2023-10-24 | End: 2024-01-18 | Stop reason: SDUPTHER

## 2023-10-24 ASSESSMENT — PATIENT HEALTH QUESTIONNAIRE - PHQ9
1. LITTLE INTEREST OR PLEASURE IN DOING THINGS: NEARLY EVERY DAY
2. FEELING DOWN, DEPRESSED OR HOPELESS: NEARLY EVERY DAY
SUM OF ALL RESPONSES TO PHQ9 QUESTIONS 1 AND 2: 6

## 2023-10-24 NOTE — PROGRESS NOTES
"Subjective   Patient ID: Nuzhat Saenz is a 38 y.o. female who presents for Rash (Patient would like to discuss Altreno cream for acne.).  HPI  To discuss Altreno therapy.  Patient has history of chronic acne and has been using retinoids for some time.  Patient was previously obtaining them from telehealth dermatology but is requesting that we take this over.  Is also requesting several medication refills.  No other complaints.    Review of Systems    Constitutional:  See HPI   Integumentary: See HPI  Neurologic:  Alert and oriented X4, No numbness, No tingling.    All other systems are negative     Objective     /80   Pulse 101   Ht 1.727 m (5' 8\")   Wt 75.8 kg (167 lb)   BMI 25.39 kg/m²     Physical Exam    General:  Alert and oriented, No acute distress.    Eye:  Pupils are equal, round and reactive to light, Normal conjunctiva.    HENT:  Normocephalic,   Neck:  Supple    Respiratory: Respirations are non-labored   Musculoskeletal: Normal ROM and strength  Integumentary:  Warm, Dry, Intact, No pallor, No rash.    Neurologic:  Alert, Oriented, Normal sensory, Cranial Nerves II-XII are grossly intact  Psychiatric:  Cooperative, Appropriate mood & affect.    Assessment/Plan   Acne vulgaris: Altreno called in.    Remaining medication refills were completed.  Patient has lab orders in the chart from July patient is encouraged to get these done.    Follow-up as scheduled or as needed.  Problem List Items Addressed This Visit    None  Visit Diagnoses       Acne, unspecified acne type    -  Primary    Relevant Medications    tretinoin (Altreno) 0.05 % lotion    Migraine without aura and without status migrainosus, not intractable        Relevant Medications    butalbital-acetaminophen-caff -40 mg tablet    SUMAtriptan (Imitrex) 100 mg tablet    Mixed anxiety and depressive disorder        Relevant Medications    LORazepam (Ativan) 1 mg tablet    Panic attack        Relevant Medications    " LORazepam (Ativan) 1 mg tablet            Final diagnoses:   [G43.009] Migraine without aura and without status migrainosus, not intractable   [F41.8] Mixed anxiety and depressive disorder   [F41.0] Panic attack   [L70.9] Acne, unspecified acne type

## 2023-10-26 ENCOUNTER — HOSPITAL ENCOUNTER (EMERGENCY)
Facility: HOSPITAL | Age: 38
Discharge: HOME | End: 2023-10-26
Attending: EMERGENCY MEDICINE
Payer: COMMERCIAL

## 2023-10-26 VITALS
HEART RATE: 93 BPM | BODY MASS INDEX: 25.31 KG/M2 | HEIGHT: 68 IN | DIASTOLIC BLOOD PRESSURE: 73 MMHG | RESPIRATION RATE: 16 BRPM | OXYGEN SATURATION: 97 % | TEMPERATURE: 99.3 F | WEIGHT: 167 LBS | SYSTOLIC BLOOD PRESSURE: 107 MMHG

## 2023-10-26 DIAGNOSIS — F32.A DEPRESSION, UNSPECIFIED DEPRESSION TYPE: Primary | ICD-10-CM

## 2023-10-26 LAB
ALBUMIN SERPL BCP-MCNC: 4.1 G/DL (ref 3.4–5)
ALP SERPL-CCNC: 44 U/L (ref 33–110)
ALT SERPL W P-5'-P-CCNC: 11 U/L (ref 7–45)
AMPHETAMINES UR QL SCN: ABNORMAL
ANION GAP SERPL CALC-SCNC: 11 MMOL/L (ref 10–20)
APAP SERPL-MCNC: <10 UG/ML
APPEARANCE UR: CLEAR
AST SERPL W P-5'-P-CCNC: 19 U/L (ref 9–39)
BARBITURATES UR QL SCN: ABNORMAL
BASOPHILS # BLD AUTO: 0.1 X10*3/UL (ref 0–0.1)
BASOPHILS NFR BLD AUTO: 1.4 %
BENZODIAZ UR QL SCN: ABNORMAL
BILIRUB SERPL-MCNC: 0.4 MG/DL (ref 0–1.2)
BILIRUB UR STRIP.AUTO-MCNC: NEGATIVE MG/DL
BUN SERPL-MCNC: 14 MG/DL (ref 6–23)
BZE UR QL SCN: ABNORMAL
CALCIUM SERPL-MCNC: 8.5 MG/DL (ref 8.6–10.3)
CANNABINOIDS UR QL SCN: ABNORMAL
CHLORIDE SERPL-SCNC: 107 MMOL/L (ref 98–107)
CO2 SERPL-SCNC: 23 MMOL/L (ref 21–32)
COLOR UR: NORMAL
CREAT SERPL-MCNC: 0.84 MG/DL (ref 0.5–1.05)
EOSINOPHIL # BLD AUTO: 0.08 X10*3/UL (ref 0–0.7)
EOSINOPHIL NFR BLD AUTO: 1.1 %
ERYTHROCYTE [DISTWIDTH] IN BLOOD BY AUTOMATED COUNT: 12.7 % (ref 11.5–14.5)
ETHANOL SERPL-MCNC: <10 MG/DL
FENTANYL+NORFENTANYL UR QL SCN: ABNORMAL
GFR SERPL CREATININE-BSD FRML MDRD: >90 ML/MIN/1.73M*2
GLUCOSE SERPL-MCNC: 100 MG/DL (ref 74–99)
GLUCOSE UR STRIP.AUTO-MCNC: NEGATIVE MG/DL
HCG UR QL IA.RAPID: NEGATIVE
HCT VFR BLD AUTO: 31.4 % (ref 36–46)
HGB BLD-MCNC: 10.2 G/DL (ref 12–16)
IMM GRANULOCYTES # BLD AUTO: 0.01 X10*3/UL (ref 0–0.7)
IMM GRANULOCYTES NFR BLD AUTO: 0.1 % (ref 0–0.9)
KETONES UR STRIP.AUTO-MCNC: NEGATIVE MG/DL
LEUKOCYTE ESTERASE UR QL STRIP.AUTO: NEGATIVE
LYMPHOCYTES # BLD AUTO: 1.84 X10*3/UL (ref 1.2–4.8)
LYMPHOCYTES NFR BLD AUTO: 24.9 %
MCH RBC QN AUTO: 30.2 PG (ref 26–34)
MCHC RBC AUTO-ENTMCNC: 32.5 G/DL (ref 32–36)
MCV RBC AUTO: 93 FL (ref 80–100)
MONOCYTES # BLD AUTO: 0.64 X10*3/UL (ref 0.1–1)
MONOCYTES NFR BLD AUTO: 8.7 %
NEUTROPHILS # BLD AUTO: 4.71 X10*3/UL (ref 1.2–7.7)
NEUTROPHILS NFR BLD AUTO: 63.8 %
NITRITE UR QL STRIP.AUTO: NEGATIVE
NRBC BLD-RTO: 0 /100 WBCS (ref 0–0)
OPIATES UR QL SCN: ABNORMAL
OXYCODONE+OXYMORPHONE UR QL SCN: ABNORMAL
PCP UR QL SCN: ABNORMAL
PH UR STRIP.AUTO: 6 [PH]
PLATELET # BLD AUTO: 260 X10*3/UL (ref 150–450)
PMV BLD AUTO: 9.1 FL (ref 7.5–11.5)
POTASSIUM SERPL-SCNC: 3.5 MMOL/L (ref 3.5–5.3)
PROT SERPL-MCNC: 6.6 G/DL (ref 6.4–8.2)
PROT UR STRIP.AUTO-MCNC: NEGATIVE MG/DL
RBC # BLD AUTO: 3.38 X10*6/UL (ref 4–5.2)
RBC # UR STRIP.AUTO: NEGATIVE /UL
SALICYLATES SERPL-MCNC: <3 MG/DL
SODIUM SERPL-SCNC: 137 MMOL/L (ref 136–145)
SP GR UR STRIP.AUTO: 1.01
UROBILINOGEN UR STRIP.AUTO-MCNC: <2 MG/DL
WBC # BLD AUTO: 7.4 X10*3/UL (ref 4.4–11.3)

## 2023-10-26 PROCEDURE — 99284 EMERGENCY DEPT VISIT MOD MDM: CPT | Performed by: EMERGENCY MEDICINE

## 2023-10-26 PROCEDURE — 80320 DRUG SCREEN QUANTALCOHOLS: CPT | Performed by: EMERGENCY MEDICINE

## 2023-10-26 PROCEDURE — 80307 DRUG TEST PRSMV CHEM ANLYZR: CPT | Performed by: EMERGENCY MEDICINE

## 2023-10-26 PROCEDURE — 80143 DRUG ASSAY ACETAMINOPHEN: CPT | Mod: 59 | Performed by: EMERGENCY MEDICINE

## 2023-10-26 PROCEDURE — 2500000004 HC RX 250 GENERAL PHARMACY W/ HCPCS (ALT 636 FOR OP/ED): Performed by: EMERGENCY MEDICINE

## 2023-10-26 PROCEDURE — 81003 URINALYSIS AUTO W/O SCOPE: CPT | Mod: 59 | Performed by: EMERGENCY MEDICINE

## 2023-10-26 PROCEDURE — 81025 URINE PREGNANCY TEST: CPT | Performed by: EMERGENCY MEDICINE

## 2023-10-26 PROCEDURE — S0166 INJ OLANZAPINE 2.5MG: HCPCS | Performed by: EMERGENCY MEDICINE

## 2023-10-26 PROCEDURE — 96372 THER/PROPH/DIAG INJ SC/IM: CPT

## 2023-10-26 PROCEDURE — 99285 EMERGENCY DEPT VISIT HI MDM: CPT | Mod: 25

## 2023-10-26 PROCEDURE — 2500000001 HC RX 250 WO HCPCS SELF ADMINISTERED DRUGS (ALT 637 FOR MEDICARE OP): Performed by: EMERGENCY MEDICINE

## 2023-10-26 PROCEDURE — 85025 COMPLETE CBC W/AUTO DIFF WBC: CPT | Performed by: EMERGENCY MEDICINE

## 2023-10-26 PROCEDURE — 80053 COMPREHEN METABOLIC PANEL: CPT | Performed by: EMERGENCY MEDICINE

## 2023-10-26 PROCEDURE — 36415 COLL VENOUS BLD VENIPUNCTURE: CPT | Performed by: EMERGENCY MEDICINE

## 2023-10-26 PROCEDURE — 80143 DRUG ASSAY ACETAMINOPHEN: CPT | Performed by: EMERGENCY MEDICINE

## 2023-10-26 RX ORDER — BUTALBITAL, ASPIRIN AND CAFFEINE 50; 325; 40 MG/1; MG/1; MG/1
1 TABLET ORAL EVERY 4 HOURS PRN
COMMUNITY
Start: 2023-10-24 | End: 2024-01-03 | Stop reason: SDUPTHER

## 2023-10-26 RX ORDER — LORAZEPAM 1 MG/1
1 TABLET ORAL ONCE
Status: COMPLETED | OUTPATIENT
Start: 2023-10-26 | End: 2023-10-26

## 2023-10-26 RX ORDER — MIRTAZAPINE 45 MG/1
45 TABLET, FILM COATED ORAL NIGHTLY
COMMUNITY
Start: 2023-10-17 | End: 2024-01-03 | Stop reason: SDUPTHER

## 2023-10-26 RX ORDER — OLANZAPINE 10 MG/2ML
10 INJECTION, POWDER, FOR SOLUTION INTRAMUSCULAR ONCE
Status: COMPLETED | OUTPATIENT
Start: 2023-10-26 | End: 2023-10-26

## 2023-10-26 RX ADMIN — OLANZAPINE 10 MG: 10 INJECTION, POWDER, FOR SOLUTION INTRAMUSCULAR at 17:25

## 2023-10-26 RX ADMIN — LORAZEPAM 1 MG: 1 TABLET ORAL at 16:58

## 2023-10-26 SDOH — HEALTH STABILITY: MENTAL HEALTH: BEHAVIORS/MOOD: AGITATED

## 2023-10-26 SDOH — SOCIAL STABILITY: SOCIAL NETWORK: VISITOR BEHAVIORS: AGGRESSIVE VERBALLY

## 2023-10-26 ASSESSMENT — PAIN - FUNCTIONAL ASSESSMENT: PAIN_FUNCTIONAL_ASSESSMENT: 0-10

## 2023-10-26 ASSESSMENT — COLUMBIA-SUICIDE SEVERITY RATING SCALE - C-SSRS
1. IN THE PAST MONTH, HAVE YOU WISHED YOU WERE DEAD OR WISHED YOU COULD GO TO SLEEP AND NOT WAKE UP?: YES
2. HAVE YOU ACTUALLY HAD ANY THOUGHTS OF KILLING YOURSELF?: NO
6. HAVE YOU EVER DONE ANYTHING, STARTED TO DO ANYTHING, OR PREPARED TO DO ANYTHING TO END YOUR LIFE?: YES
5. HAVE YOU STARTED TO WORK OUT OR WORKED OUT THE DETAILS OF HOW TO KILL YOURSELF? DO YOU INTEND TO CARRY OUT THIS PLAN?: NO
2. HAVE YOU ACTUALLY HAD ANY THOUGHTS OF KILLING YOURSELF?: YES
1. SINCE LAST CONTACT, HAVE YOU WISHED YOU WERE DEAD OR WISHED YOU COULD GO TO SLEEP AND NOT WAKE UP?: NO
6. HAVE YOU EVER DONE ANYTHING, STARTED TO DO ANYTHING, OR PREPARED TO DO ANYTHING TO END YOUR LIFE?: NO
4. HAVE YOU HAD THESE THOUGHTS AND HAD SOME INTENTION OF ACTING ON THEM?: NO
6. HAVE YOU EVER DONE ANYTHING, STARTED TO DO ANYTHING, OR PREPARED TO DO ANYTHING TO END YOUR LIFE?: NO

## 2023-10-26 ASSESSMENT — PAIN SCALES - GENERAL: PAINLEVEL_OUTOF10: 4
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
## 2023-10-27 ENCOUNTER — HOSPITAL ENCOUNTER (EMERGENCY)
Facility: HOSPITAL | Age: 38
Discharge: OTHER INSTITUTION | End: 2023-10-27
Attending: EMERGENCY MEDICINE
Payer: COMMERCIAL

## 2023-10-27 VITALS
DIASTOLIC BLOOD PRESSURE: 72 MMHG | HEART RATE: 85 BPM | RESPIRATION RATE: 18 BRPM | BODY MASS INDEX: 23.91 KG/M2 | WEIGHT: 167 LBS | HEIGHT: 70 IN | TEMPERATURE: 96.6 F | OXYGEN SATURATION: 99 % | SYSTOLIC BLOOD PRESSURE: 115 MMHG

## 2023-10-27 DIAGNOSIS — F31.11 BIPOLAR 1 DISORDER, MANIC, MILD (MULTI): Primary | ICD-10-CM

## 2023-10-27 LAB
AMPHETAMINES UR QL SCN: ABNORMAL
BARBITURATES UR QL SCN: ABNORMAL
BENZODIAZ UR QL SCN: ABNORMAL
BZE UR QL SCN: ABNORMAL
CANNABINOIDS UR QL SCN: ABNORMAL
FENTANYL+NORFENTANYL UR QL SCN: ABNORMAL
OPIATES UR QL SCN: ABNORMAL
OXYCODONE+OXYMORPHONE UR QL SCN: ABNORMAL
PCP UR QL SCN: ABNORMAL

## 2023-10-27 PROCEDURE — 99285 EMERGENCY DEPT VISIT HI MDM: CPT | Mod: 25 | Performed by: EMERGENCY MEDICINE

## 2023-10-27 PROCEDURE — 80307 DRUG TEST PRSMV CHEM ANLYZR: CPT | Performed by: EMERGENCY MEDICINE

## 2023-10-27 PROCEDURE — 99284 EMERGENCY DEPT VISIT MOD MDM: CPT | Performed by: EMERGENCY MEDICINE

## 2023-10-27 PROCEDURE — 2500000001 HC RX 250 WO HCPCS SELF ADMINISTERED DRUGS (ALT 637 FOR MEDICARE OP): Performed by: EMERGENCY MEDICINE

## 2023-10-27 PROCEDURE — 96372 THER/PROPH/DIAG INJ SC/IM: CPT | Performed by: EMERGENCY MEDICINE

## 2023-10-27 PROCEDURE — 2500000004 HC RX 250 GENERAL PHARMACY W/ HCPCS (ALT 636 FOR OP/ED)

## 2023-10-27 RX ORDER — LORAZEPAM 2 MG/ML
INJECTION INTRAMUSCULAR
Status: COMPLETED
Start: 2023-10-27 | End: 2023-10-27

## 2023-10-27 RX ORDER — DULOXETIN HYDROCHLORIDE 30 MG/1
30 CAPSULE, DELAYED RELEASE ORAL DAILY
Status: DISCONTINUED | OUTPATIENT
Start: 2023-10-27 | End: 2023-10-27 | Stop reason: HOSPADM

## 2023-10-27 RX ORDER — LORAZEPAM 2 MG/ML
1 INJECTION INTRAMUSCULAR ONCE
Status: COMPLETED | OUTPATIENT
Start: 2023-10-27 | End: 2023-10-27

## 2023-10-27 RX ORDER — GABAPENTIN 100 MG/1
600 CAPSULE ORAL EVERY 8 HOURS SCHEDULED
Status: DISCONTINUED | OUTPATIENT
Start: 2023-10-27 | End: 2023-10-27 | Stop reason: HOSPADM

## 2023-10-27 RX ORDER — MIRTAZAPINE 15 MG/1
45 TABLET, FILM COATED ORAL NIGHTLY
Status: DISCONTINUED | OUTPATIENT
Start: 2023-10-27 | End: 2023-10-27 | Stop reason: HOSPADM

## 2023-10-27 RX ADMIN — MIRTAZAPINE 45 MG: 15 TABLET, FILM COATED ORAL at 01:42

## 2023-10-27 RX ADMIN — GABAPENTIN 600 MG: 100 CAPSULE ORAL at 01:42

## 2023-10-27 RX ADMIN — DULOXETINE HYDROCHLORIDE 30 MG: 30 CAPSULE, DELAYED RELEASE ORAL at 01:42

## 2023-10-27 RX ADMIN — LORAZEPAM 1 MG: 2 INJECTION INTRAMUSCULAR at 01:15

## 2023-10-27 RX ADMIN — LORAZEPAM 1 MG: 2 INJECTION, SOLUTION INTRAMUSCULAR; INTRAVENOUS at 01:15

## 2023-10-27 SDOH — HEALTH STABILITY: MENTAL HEALTH: CONTENT: BLAMING OTHERS;COMPULSION;OBSESSIONS

## 2023-10-27 SDOH — HEALTH STABILITY: MENTAL HEALTH: HAVE YOU WISHED YOU WERE DEAD OR WISHED YOU COULD GO TO SLEEP AND NOT WAKE UP?: NO

## 2023-10-27 SDOH — HEALTH STABILITY: MENTAL HEALTH
HAVE YOU STARTED TO WORK OUT OR WORKED OUT THE DETAILS OF HOW TO KILL YOURSELF? DO YOU INTENT TO CARRY OUT THIS PLAN?: NO

## 2023-10-27 SDOH — HEALTH STABILITY: MENTAL HEALTH: BEHAVIORS/MOOD: COOPERATIVE

## 2023-10-27 SDOH — HEALTH STABILITY: MENTAL HEALTH: HAVE YOU HAD THESE THOUGHTS AND HAD SOME INTENTION OF ACTING ON THEM?: YES

## 2023-10-27 SDOH — HEALTH STABILITY: MENTAL HEALTH: SUICIDE ASSESSMENT: ADULT (C-SSRS)

## 2023-10-27 SDOH — HEALTH STABILITY: MENTAL HEALTH: HAVE YOU EVER DONE ANYTHING, STARTED TO DO ANYTHING, OR PREPARED TO DO ANYTHING TO END YOUR LIFE?: YES

## 2023-10-27 SDOH — HEALTH STABILITY: MENTAL HEALTH: HAVE YOU BEEN THINKING ABOUT HOW YOU MIGHT DO THIS?: YES

## 2023-10-27 SDOH — HEALTH STABILITY: MENTAL HEALTH: NEEDS EXPRESSED: DENIES

## 2023-10-27 SDOH — HEALTH STABILITY: MENTAL HEALTH: SLEEP PATTERN: RESTLESSNESS

## 2023-10-27 SDOH — HEALTH STABILITY: MENTAL HEALTH: WAS THIS WITHIN THE PAST THREE MONTHS?: YES

## 2023-10-27 SDOH — HEALTH STABILITY: MENTAL HEALTH
BEHAVIORS/MOOD: AGITATED;ANGRY;SAD;TEARFUL;NON-COMPLIANT;RESTLESS;CRYING;AGGRESSIVE VERBALLY, OTHERS;APPROPRIATE FOR SITUATION;ANXIOUS

## 2023-10-27 SDOH — HEALTH STABILITY: MENTAL HEALTH: HALLUCINATION: UNABLE TO ASSESS

## 2023-10-27 SDOH — HEALTH STABILITY: MENTAL HEALTH: BEHAVIORS/MOOD: ANXIOUS;AGITATED

## 2023-10-27 SDOH — HEALTH STABILITY: MENTAL HEALTH: SLEEP PATTERN: RESTLESSNESS;DISTURBED/INTERRUPTED SLEEP

## 2023-10-27 SDOH — HEALTH STABILITY: MENTAL HEALTH: HAVE YOU ACTUALLY HAD ANY THOUGHTS OF KILLING YOURSELF?: YES

## 2023-10-27 ASSESSMENT — PAIN SCALES - GENERAL: PAINLEVEL_OUTOF10: 0 - NO PAIN

## 2023-10-27 ASSESSMENT — COLUMBIA-SUICIDE SEVERITY RATING SCALE - C-SSRS
4. HAVE YOU HAD THESE THOUGHTS AND HAD SOME INTENTION OF ACTING ON THEM?: NO
5. HAVE YOU STARTED TO WORK OUT OR WORKED OUT THE DETAILS OF HOW TO KILL YOURSELF? DO YOU INTEND TO CARRY OUT THIS PLAN?: NO
2. HAVE YOU ACTUALLY HAD ANY THOUGHTS OF KILLING YOURSELF?: YES
6. HAVE YOU EVER DONE ANYTHING, STARTED TO DO ANYTHING, OR PREPARED TO DO ANYTHING TO END YOUR LIFE?: NO
6. HAVE YOU EVER DONE ANYTHING, STARTED TO DO ANYTHING, OR PREPARED TO DO ANYTHING TO END YOUR LIFE?: NO
1. IN THE PAST MONTH, HAVE YOU WISHED YOU WERE DEAD OR WISHED YOU COULD GO TO SLEEP AND NOT WAKE UP?: YES

## 2023-10-27 ASSESSMENT — PAIN - FUNCTIONAL ASSESSMENT: PAIN_FUNCTIONAL_ASSESSMENT: 0-10
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
## 2023-11-01 ENCOUNTER — PHARMACY VISIT (OUTPATIENT)
Dept: PHARMACY | Facility: CLINIC | Age: 38
End: 2023-11-01
Payer: MEDICAID

## 2023-11-01 ENCOUNTER — TELEPHONE (OUTPATIENT)
Dept: PRIMARY CARE | Facility: CLINIC | Age: 38
End: 2023-11-01

## 2023-11-01 ENCOUNTER — OFFICE VISIT (OUTPATIENT)
Dept: PRIMARY CARE | Facility: CLINIC | Age: 38
End: 2023-11-01
Payer: COMMERCIAL

## 2023-11-01 VITALS
HEART RATE: 75 BPM | HEIGHT: 68 IN | SYSTOLIC BLOOD PRESSURE: 119 MMHG | BODY MASS INDEX: 24.6 KG/M2 | DIASTOLIC BLOOD PRESSURE: 77 MMHG | WEIGHT: 162.3 LBS

## 2023-11-01 DIAGNOSIS — M79.7 FIBROMYALGIA: Primary | ICD-10-CM

## 2023-11-01 DIAGNOSIS — F41.8 MIXED ANXIETY AND DEPRESSIVE DISORDER: ICD-10-CM

## 2023-11-01 DIAGNOSIS — F51.04 PSYCHOPHYSIOLOGICAL INSOMNIA: ICD-10-CM

## 2023-11-01 DIAGNOSIS — F41.0 PANIC ATTACK: ICD-10-CM

## 2023-11-01 PROCEDURE — 1036F TOBACCO NON-USER: CPT | Performed by: PHYSICIAN ASSISTANT

## 2023-11-01 PROCEDURE — RXMED WILLOW AMBULATORY MEDICATION CHARGE

## 2023-11-01 PROCEDURE — 99214 OFFICE O/P EST MOD 30 MIN: CPT | Performed by: PHYSICIAN ASSISTANT

## 2023-11-01 RX ORDER — PREGABALIN 75 MG/1
75 CAPSULE ORAL 2 TIMES DAILY
Qty: 60 CAPSULE | Refills: 0 | Status: SHIPPED | OUTPATIENT
Start: 2023-11-01 | End: 2024-01-03 | Stop reason: WASHOUT

## 2023-11-01 ASSESSMENT — PATIENT HEALTH QUESTIONNAIRE - PHQ9
SUM OF ALL RESPONSES TO PHQ9 QUESTIONS 1 AND 2: 6
2. FEELING DOWN, DEPRESSED OR HOPELESS: NEARLY EVERY DAY
1. LITTLE INTEREST OR PLEASURE IN DOING THINGS: NEARLY EVERY DAY
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
## 2023-11-02 ENCOUNTER — APPOINTMENT (OUTPATIENT)
Dept: PRIMARY CARE | Facility: CLINIC | Age: 38
End: 2023-11-02
Payer: COMMERCIAL

## 2023-11-07 ENCOUNTER — PHARMACY VISIT (OUTPATIENT)
Dept: PHARMACY | Facility: CLINIC | Age: 38
End: 2023-11-07
Payer: MEDICAID

## 2023-11-07 PROCEDURE — RXMED WILLOW AMBULATORY MEDICATION CHARGE

## 2023-11-09 ENCOUNTER — HOSPITAL ENCOUNTER (EMERGENCY)
Facility: HOSPITAL | Age: 38
Discharge: HOME | End: 2023-11-10
Attending: EMERGENCY MEDICINE
Payer: COMMERCIAL

## 2023-11-09 DIAGNOSIS — F31.9 BIPOLAR 1 DISORDER (MULTI): Primary | ICD-10-CM

## 2023-11-09 PROCEDURE — 99285 EMERGENCY DEPT VISIT HI MDM: CPT | Mod: 25

## 2023-11-09 PROCEDURE — 99285 EMERGENCY DEPT VISIT HI MDM: CPT | Performed by: EMERGENCY MEDICINE

## 2023-11-09 ASSESSMENT — PAIN DESCRIPTION - LOCATION: LOCATION: SACRUM

## 2023-11-09 ASSESSMENT — PAIN SCALES - GENERAL: PAINLEVEL_OUTOF10: 4

## 2023-11-09 ASSESSMENT — PAIN DESCRIPTION - PAIN TYPE: TYPE: ACUTE PAIN

## 2023-11-09 ASSESSMENT — PAIN DESCRIPTION - ONSET: ONSET: SUDDEN

## 2023-11-09 ASSESSMENT — PAIN DESCRIPTION - DESCRIPTORS: DESCRIPTORS: ACHING

## 2023-11-09 ASSESSMENT — PAIN DESCRIPTION - FREQUENCY: FREQUENCY: CONSTANT/CONTINUOUS

## 2023-11-09 ASSESSMENT — PAIN - FUNCTIONAL ASSESSMENT: PAIN_FUNCTIONAL_ASSESSMENT: 0-10

## 2023-11-10 ENCOUNTER — APPOINTMENT (OUTPATIENT)
Dept: RADIOLOGY | Facility: HOSPITAL | Age: 38
End: 2023-11-10
Payer: COMMERCIAL

## 2023-11-10 VITALS
HEART RATE: 100 BPM | DIASTOLIC BLOOD PRESSURE: 106 MMHG | OXYGEN SATURATION: 99 % | RESPIRATION RATE: 18 BRPM | SYSTOLIC BLOOD PRESSURE: 121 MMHG | TEMPERATURE: 98.2 F

## 2023-11-10 LAB
ALBUMIN SERPL BCP-MCNC: 4.2 G/DL (ref 3.4–5)
ALP SERPL-CCNC: 67 U/L (ref 33–110)
ALT SERPL W P-5'-P-CCNC: 14 U/L (ref 7–45)
AMPHETAMINES UR QL SCN: ABNORMAL
ANION GAP SERPL CALC-SCNC: 9 MMOL/L (ref 10–20)
AST SERPL W P-5'-P-CCNC: 17 U/L (ref 9–39)
BARBITURATES UR QL SCN: ABNORMAL
BASOPHILS # BLD AUTO: 0.09 X10*3/UL (ref 0–0.1)
BASOPHILS NFR BLD AUTO: 0.7 %
BENZODIAZ UR QL SCN: ABNORMAL
BILIRUB SERPL-MCNC: 0.3 MG/DL (ref 0–1.2)
BUN SERPL-MCNC: 15 MG/DL (ref 6–23)
BZE UR QL SCN: ABNORMAL
CALCIUM SERPL-MCNC: 9.3 MG/DL (ref 8.6–10.3)
CANNABINOIDS UR QL SCN: ABNORMAL
CHLORIDE SERPL-SCNC: 104 MMOL/L (ref 98–107)
CO2 SERPL-SCNC: 30 MMOL/L (ref 21–32)
CREAT SERPL-MCNC: 0.89 MG/DL (ref 0.5–1.05)
EOSINOPHIL # BLD AUTO: 0.16 X10*3/UL (ref 0–0.7)
EOSINOPHIL NFR BLD AUTO: 1.2 %
ERYTHROCYTE [DISTWIDTH] IN BLOOD BY AUTOMATED COUNT: 12.6 % (ref 11.5–14.5)
ETHANOL SERPL-MCNC: <10 MG/DL
FENTANYL+NORFENTANYL UR QL SCN: ABNORMAL
GFR SERPL CREATININE-BSD FRML MDRD: 85 ML/MIN/1.73M*2
GLUCOSE SERPL-MCNC: 94 MG/DL (ref 74–99)
HCT VFR BLD AUTO: 36.1 % (ref 36–46)
HGB BLD-MCNC: 11.5 G/DL (ref 12–16)
IMM GRANULOCYTES # BLD AUTO: 0.04 X10*3/UL (ref 0–0.7)
IMM GRANULOCYTES NFR BLD AUTO: 0.3 % (ref 0–0.9)
LYMPHOCYTES # BLD AUTO: 2.6 X10*3/UL (ref 1.2–4.8)
LYMPHOCYTES NFR BLD AUTO: 18.9 %
MCH RBC QN AUTO: 29.9 PG (ref 26–34)
MCHC RBC AUTO-ENTMCNC: 31.9 G/DL (ref 32–36)
MCV RBC AUTO: 94 FL (ref 80–100)
MONOCYTES # BLD AUTO: 1.08 X10*3/UL (ref 0.1–1)
MONOCYTES NFR BLD AUTO: 7.9 %
NEUTROPHILS # BLD AUTO: 9.77 X10*3/UL (ref 1.2–7.7)
NEUTROPHILS NFR BLD AUTO: 71 %
NRBC BLD-RTO: 0 /100 WBCS (ref 0–0)
OPIATES UR QL SCN: ABNORMAL
OXYCODONE+OXYMORPHONE UR QL SCN: ABNORMAL
PCP UR QL SCN: ABNORMAL
PLATELET # BLD AUTO: 337 X10*3/UL (ref 150–450)
POTASSIUM SERPL-SCNC: 3.7 MMOL/L (ref 3.5–5.3)
PROT SERPL-MCNC: 6.9 G/DL (ref 6.4–8.2)
RBC # BLD AUTO: 3.85 X10*6/UL (ref 4–5.2)
SODIUM SERPL-SCNC: 139 MMOL/L (ref 136–145)
WBC # BLD AUTO: 13.7 X10*3/UL (ref 4.4–11.3)

## 2023-11-10 PROCEDURE — 71046 X-RAY EXAM CHEST 2 VIEWS: CPT

## 2023-11-10 PROCEDURE — 36415 COLL VENOUS BLD VENIPUNCTURE: CPT | Performed by: EMERGENCY MEDICINE

## 2023-11-10 PROCEDURE — 73560 X-RAY EXAM OF KNEE 1 OR 2: CPT | Mod: RT,FY

## 2023-11-10 PROCEDURE — 70450 CT HEAD/BRAIN W/O DYE: CPT | Performed by: RADIOLOGY

## 2023-11-10 PROCEDURE — 80053 COMPREHEN METABOLIC PANEL: CPT | Performed by: EMERGENCY MEDICINE

## 2023-11-10 PROCEDURE — 72125 CT NECK SPINE W/O DYE: CPT | Performed by: RADIOLOGY

## 2023-11-10 PROCEDURE — 80307 DRUG TEST PRSMV CHEM ANLYZR: CPT | Performed by: EMERGENCY MEDICINE

## 2023-11-10 PROCEDURE — 73560 X-RAY EXAM OF KNEE 1 OR 2: CPT | Mod: LT

## 2023-11-10 PROCEDURE — 71046 X-RAY EXAM CHEST 2 VIEWS: CPT | Performed by: RADIOLOGY

## 2023-11-10 PROCEDURE — 70450 CT HEAD/BRAIN W/O DYE: CPT

## 2023-11-10 PROCEDURE — 93005 ELECTROCARDIOGRAM TRACING: CPT

## 2023-11-10 PROCEDURE — 82077 ASSAY SPEC XCP UR&BREATH IA: CPT | Performed by: EMERGENCY MEDICINE

## 2023-11-10 PROCEDURE — 85025 COMPLETE CBC W/AUTO DIFF WBC: CPT | Performed by: EMERGENCY MEDICINE

## 2023-11-10 PROCEDURE — 72131 CT LUMBAR SPINE W/O DYE: CPT | Performed by: RADIOLOGY

## 2023-11-10 PROCEDURE — 73560 X-RAY EXAM OF KNEE 1 OR 2: CPT | Mod: LEFT SIDE | Performed by: RADIOLOGY

## 2023-11-10 PROCEDURE — 72131 CT LUMBAR SPINE W/O DYE: CPT

## 2023-11-10 PROCEDURE — 72125 CT NECK SPINE W/O DYE: CPT

## 2023-11-10 PROCEDURE — 73560 X-RAY EXAM OF KNEE 1 OR 2: CPT | Mod: RIGHT SIDE | Performed by: RADIOLOGY

## 2023-11-10 RX ORDER — ACETAMINOPHEN 325 MG/1
650 TABLET ORAL ONCE
Status: DISCONTINUED | OUTPATIENT
Start: 2023-11-10 | End: 2023-11-10 | Stop reason: HOSPADM

## 2023-11-10 ASSESSMENT — COLUMBIA-SUICIDE SEVERITY RATING SCALE - C-SSRS
2. HAVE YOU ACTUALLY HAD ANY THOUGHTS OF KILLING YOURSELF?: NO
1. IN THE PAST MONTH, HAVE YOU WISHED YOU WERE DEAD OR WISHED YOU COULD GO TO SLEEP AND NOT WAKE UP?: NO
6. HAVE YOU EVER DONE ANYTHING, STARTED TO DO ANYTHING, OR PREPARED TO DO ANYTHING TO END YOUR LIFE?: NO

## 2023-11-10 NOTE — ED PROVIDER NOTES
HPI   Chief Complaint   Patient presents with    Agitation     Pt fell down stairs tonight. [employer] states multiple calls by Pts mom and son about SI. Pt is denying SI here. Just wants her son out of her house. Pt denies drug use or drinking tonight. C/o pain to her tailbone.        38-year-old female presents by ambulance with police.  Patient is allegedly stating that she fell down several steps.  Patient continues to talk about her son compensating her medication.  Patient denies any loss of consciousness.  Is complaining of some pain in her knees and lumbosacral region.  Lower extremities have significant bruising to both knees.  Some of the bruising is older than others.  I did speak to the patient at length and she was not receptive to what I had to say as she continued to ask for something for pain.  Patient was seen by [facility] and they are comfortable with the patient being discharged home.      History provided by:  Patient                      No data recorded                Patient History   Past Medical History:   Diagnosis Date    Anxiety     Calculus of kidney     Recurrent kidney stones    Depression     Fibromyalgia     Interstitial cystitis     Migraines     OCD (obsessive compulsive disorder)     Other specified anxiety disorders     Depression with anxiety    Other specified diseases of liver     Intrahepatic cholestasis    Personal history of diseases of the blood and blood-forming organs and certain disorders involving the immune mechanism     History of anemia    Personal history of other diseases of the nervous system and sense organs     History of eustachian tube dysfunction    Personal history of other diseases of the respiratory system     History of sinusitis    Personal history of other diseases of urinary system     History of kidney disease    Personal history of other mental and behavioral disorders     History of panic attacks    Personal history of urinary (tract) infections      History of cystitis     Past Surgical History:   Procedure Laterality Date    APPENDECTOMY  2016    Appendectomy    BREAST LUMPECTOMY  2016    Breast Surgery Lumpectomy     SECTION, CLASSIC  2016     Section    GALLBLADDER SURGERY  2016    Gallbladder Surgery    MYRINGOTOMY W/ TUBES      OTHER SURGICAL HISTORY  2016    Ureterotomy For Insertion Of Stent    OTHER SURGICAL HISTORY  2020    Dental surgery    WISDOM TOOTH EXTRACTION       Family History   Problem Relation Name Age of Onset    Dementia Mother      Dementia Father       Social History     Tobacco Use    Smoking status: Former     Types: Cigarettes    Smokeless tobacco: Never   Substance Use Topics    Alcohol use: Not Currently    Drug use: Never       Physical Exam   ED Triage Vitals [23 2345]   Temp Heart Rate Resp BP   36.8 °C (98.2 °F) 100 18 (!) 121/106      SpO2 Temp src Heart Rate Source Patient Position   99 % -- Monitor --      BP Location FiO2 (%)     Left arm --       Physical Exam  Vitals and nursing note reviewed.   Constitutional:       General: She is not in acute distress.     Appearance: She is well-developed.   HENT:      Head: Normocephalic and atraumatic.   Eyes:      Conjunctiva/sclera: Conjunctivae normal.   Cardiovascular:      Rate and Rhythm: Normal rate and regular rhythm.      Heart sounds: No murmur heard.  Pulmonary:      Effort: Pulmonary effort is normal. No respiratory distress.      Breath sounds: Normal breath sounds.   Abdominal:      Palpations: Abdomen is soft.      Tenderness: There is no abdominal tenderness.   Musculoskeletal:         General: No swelling.      Cervical back: Neck supple.   Skin:     General: Skin is warm and dry.      Capillary Refill: Capillary refill takes less than 2 seconds.      Findings: Bruising (scattered bruising of lower extremities and knees of varying age.) present.   Neurological:      Mental Status: She is alert.   Psychiatric:          Mood and Affect: Mood normal.      Comments: At the time somewhat agitated.         ED Course & MDM   Diagnoses as of 11/10/23 0439   Bipolar 1 disorder (CMS/[facility])     Labs Reviewed   CBC WITH AUTO DIFFERENTIAL - Abnormal       Result Value    WBC 13.7 (*)     nRBC 0.0      RBC 3.85 (*)     Hemoglobin 11.5 (*)     Hematocrit 36.1      MCV 94      MCH 29.9      MCHC 31.9 (*)     RDW 12.6      Platelets 337      Neutrophils % 71.0      Immature Granulocytes %, Automated 0.3      Lymphocytes % 18.9      Monocytes % 7.9      Eosinophils % 1.2      Basophils % 0.7      Neutrophils Absolute 9.77 (*)     Immature Granulocytes Absolute, Automated 0.04      Lymphocytes Absolute 2.60      Monocytes Absolute 1.08 (*)     Eosinophils Absolute 0.16      Basophils Absolute 0.09     COMPREHENSIVE METABOLIC PANEL - Abnormal    Glucose 94      Sodium 139      Potassium 3.7      Chloride 104      Bicarbonate 30      Anion Gap 9 (*)     Urea Nitrogen 15      Creatinine 0.89      eGFR 85      Calcium 9.3      Albumin 4.2      Alkaline Phosphatase 67      Total Protein 6.9      AST 17      Bilirubin, Total 0.3      ALT 14     DRUG SCREEN,URINE - Abnormal    Amphetamine Screen, Urine Presumptive Negative      Barbiturate Screen, Urine Presumptive Positive (*)     Benzodiazepines Screen, Urine Presumptive Negative      Cannabinoid Screen, Urine Presumptive Positive (*)     Cocaine Metabolite Screen, Urine Presumptive Negative      Fentanyl Screen, Urine Presumptive Negative      Opiate Screen, Urine Presumptive Negative      Oxycodone Screen, Urine Presumptive Negative      PCP Screen, Urine Presumptive Negative      Narrative:     Drug screen results are presumptive and should not be used to assess   compliance with prescribed medication. Contact the performing [facility] laboratory   to add-on definitive confirmatory testing if clinically indicated.    Toxicology screening results are reported qualitatively. The concentration must   be  greater than or equal to the cutoff to be reported as positive. The concentration   at which the screening test can detect an individual drug or metabolite varies.   The absence of expected drug(s) and/or drug metabolite(s) may indicate non-compliance,   inappropriate timing of specimen collection relative to drug administration, poor drug   absorption, diluted/adulterated urine, or limitations of testing. For medical purposes   only; not valid for forensic use.    Interpretive questions should be directed to the laboratory medical directors.   ALCOHOL - Normal    Alcohol <10        XR chest 2 views   Final Result   No acute cardiopulmonary process.        MACRO:   None        Signed by: Michelle Lawrence 11/10/2023 2:03 AM   Dictation workstation:   [id number]      XR knee left 1-2 views   Final Result   No acute osseous abnormality.             MACRO:   None        Signed by: Michelle Lawrence 11/10/2023 2:01 AM   Dictation workstation:   [id number]      XR knee right 1-2 views   Final Result   No acute osseous abnormality.             MACRO:   None        Signed by: Michelle Lawrence 11/10/2023 2:01 AM   Dictation workstation:   [id number]      CT lumbar spine wo IV contrast   Final Result   No acute fracture or traumatic subluxation of the lumbar spine.        Stool burden suggestive of constipation.        MACRO:   None.        Signed by: Jennifer Calvillo 11/10/2023 1:30 AM   Dictation workstation:   [id number]      CT cervical spine wo IV contrast   Final Result   No evidence of acute fracture or subluxation of the cervical spine.        Straightening of the normal cervical lordosis which may be secondary   to patient positioning or muscle spasm.                  MACRO:   None        Signed by: Parrish Capone 11/10/2023 1:24 AM   Dictation workstation:   [id number]      CT head wo IV contrast   Final Result   No evidence of acute intracranial hemorrhage or depressed calvarial   fracture.                  MACRO:    None        Signed by: Parrish Capone 11/10/2023 1:23 AM   Dictation workstation:   [id number]           Medical Decision Making  Resume home medications and follow-up with [facility] as instructed.  Motrin or Tylenol for pain, if symptoms worsen return to ED.    Amount and/or Complexity of Data Reviewed  Discussion of management or test interpretation with external provider(s): Twelve-lead EKG interpreted by myself at 2350 1 normal sinus rhythm 2 low voltage criteria 3 no ectopy        Procedure  Procedures     Rishabh Castro DO  11/10/23 7588

## 2023-11-13 ENCOUNTER — TELEPHONE (OUTPATIENT)
Dept: PRIMARY CARE | Facility: CLINIC | Age: 38
End: 2023-11-13
Payer: COMMERCIAL

## 2023-11-22 ENCOUNTER — PHARMACY VISIT (OUTPATIENT)
Dept: PHARMACY | Facility: CLINIC | Age: 38
End: 2023-11-22
Payer: MEDICAID

## 2023-11-22 PROCEDURE — RXMED WILLOW AMBULATORY MEDICATION CHARGE

## 2023-11-28 ENCOUNTER — PHARMACY VISIT (OUTPATIENT)
Dept: PHARMACY | Facility: CLINIC | Age: 38
End: 2023-11-28
Payer: MEDICAID

## 2023-11-28 PROCEDURE — RXMED WILLOW AMBULATORY MEDICATION CHARGE

## 2023-12-11 PROCEDURE — RXMED WILLOW AMBULATORY MEDICATION CHARGE

## 2023-12-12 ENCOUNTER — PHARMACY VISIT (OUTPATIENT)
Dept: PHARMACY | Facility: CLINIC | Age: 38
End: 2023-12-12
Payer: MEDICAID

## 2023-12-12 PROCEDURE — RXMED WILLOW AMBULATORY MEDICATION CHARGE

## 2023-12-13 ENCOUNTER — TELEPHONE (OUTPATIENT)
Dept: PRIMARY CARE | Facility: CLINIC | Age: 38
End: 2023-12-13
Payer: COMMERCIAL

## 2023-12-13 DIAGNOSIS — B37.31 CANDIDA VAGINITIS: Primary | ICD-10-CM

## 2023-12-13 RX ORDER — FLUCONAZOLE 150 MG/1
150 TABLET ORAL ONCE
Qty: 1 TABLET | Refills: 1 | Status: SHIPPED | OUTPATIENT
Start: 2023-12-13 | End: 2023-12-13

## 2023-12-13 NOTE — TELEPHONE ENCOUNTER
Patient questions if you can prescribe her a couple doses of Diflucan for yeast infection symptoms of vaginal itching and discharge x 1 week  Patient states symptoms d/t  her Trimethoprim.   RX is to go to [facility].

## 2023-12-19 ENCOUNTER — TELEPHONE (OUTPATIENT)
Dept: PRIMARY CARE | Facility: CLINIC | Age: 38
End: 2023-12-19
Payer: COMMERCIAL

## 2023-12-19 NOTE — TELEPHONE ENCOUNTER
Patient called the office and would like to wean off the Remeron as she states it is causing her to gain weight.

## 2023-12-21 ENCOUNTER — PHARMACY VISIT (OUTPATIENT)
Dept: PHARMACY | Facility: CLINIC | Age: 38
End: 2023-12-21

## 2023-12-21 PROCEDURE — RXMED WILLOW AMBULATORY MEDICATION CHARGE

## 2023-12-24 ENCOUNTER — PHARMACY VISIT (OUTPATIENT)
Dept: PHARMACY | Facility: CLINIC | Age: 38
End: 2023-12-24
Payer: MEDICAID

## 2023-12-24 PROCEDURE — RXMED WILLOW AMBULATORY MEDICATION CHARGE

## 2024-01-03 ENCOUNTER — OFFICE VISIT (OUTPATIENT)
Dept: PRIMARY CARE | Facility: CLINIC | Age: 39
End: 2024-01-03
Payer: COMMERCIAL

## 2024-01-03 ENCOUNTER — PHARMACY VISIT (OUTPATIENT)
Dept: PHARMACY | Facility: CLINIC | Age: 39
End: 2024-01-03
Payer: MEDICAID

## 2024-01-03 VITALS
HEART RATE: 85 BPM | WEIGHT: 180.4 LBS | HEIGHT: 68 IN | DIASTOLIC BLOOD PRESSURE: 86 MMHG | BODY MASS INDEX: 27.34 KG/M2 | SYSTOLIC BLOOD PRESSURE: 119 MMHG

## 2024-01-03 DIAGNOSIS — G43.009 MIGRAINE WITHOUT AURA AND WITHOUT STATUS MIGRAINOSUS, NOT INTRACTABLE: ICD-10-CM

## 2024-01-03 DIAGNOSIS — M79.7 FIBROMYALGIA: Primary | ICD-10-CM

## 2024-01-03 DIAGNOSIS — F41.8 MIXED ANXIETY AND DEPRESSIVE DISORDER: ICD-10-CM

## 2024-01-03 PROCEDURE — 1036F TOBACCO NON-USER: CPT | Performed by: PHYSICIAN ASSISTANT

## 2024-01-03 PROCEDURE — RXMED WILLOW AMBULATORY MEDICATION CHARGE

## 2024-01-03 PROCEDURE — 99214 OFFICE O/P EST MOD 30 MIN: CPT | Performed by: PHYSICIAN ASSISTANT

## 2024-01-03 RX ORDER — DULOXETIN HYDROCHLORIDE 60 MG/1
60 CAPSULE, DELAYED RELEASE ORAL DAILY
Qty: 30 CAPSULE | Refills: 11 | Status: SHIPPED | OUTPATIENT
Start: 2024-01-03

## 2024-01-03 RX ORDER — DULOXETIN HYDROCHLORIDE 30 MG/1
30 CAPSULE, DELAYED RELEASE ORAL DAILY
Qty: 30 CAPSULE | Refills: 11 | Status: SHIPPED | OUTPATIENT
Start: 2024-01-03 | End: 2025-01-02

## 2024-01-03 RX ORDER — GABAPENTIN 800 MG/1
800 TABLET ORAL 3 TIMES DAILY
Qty: 90 TABLET | Refills: 11 | Status: SHIPPED | OUTPATIENT
Start: 2024-01-03 | End: 2025-01-02

## 2024-01-03 RX ORDER — BUTALBITAL, ACETAMINOPHEN AND CAFFEINE 50; 325; 40 MG/1; MG/1; MG/1
1 TABLET ORAL EVERY 4 HOURS PRN
Qty: 30 TABLET | Refills: 1 | Status: SHIPPED | OUTPATIENT
Start: 2024-01-03 | End: 2024-05-15 | Stop reason: SDUPTHER

## 2024-01-03 ASSESSMENT — PATIENT HEALTH QUESTIONNAIRE - PHQ9
1. LITTLE INTEREST OR PLEASURE IN DOING THINGS: NEARLY EVERY DAY
SUM OF ALL RESPONSES TO PHQ9 QUESTIONS 1 AND 2: 6
2. FEELING DOWN, DEPRESSED OR HOPELESS: NEARLY EVERY DAY

## 2024-01-03 NOTE — PROGRESS NOTES
"Subjective   Patient ID: Nuzhat Saenz is a 38 y.o. female who presents for Medication Problem (Patient wants to discuss increasing dose of Gabapentin. ).  HPI  Patient presents in follow-up to discuss possible increase of gabapentin.  Patient reports losing her grandmother last month which seems to have triggered random pain in the upper and lower extremities previously attributed to fibromyalgia.  Currently, the patient is taking 600 mg 3 times daily.  No other complaints.    Review of Systems    Constitutional:  See HPI   Musculoskeletal:  see HPI  Integumentary:  No rash.    Neurologic:  see HPI  All other systems are negative     Objective     /86   Pulse 85   Ht 1.727 m (5' 8\")   Wt 81.8 kg (180 lb 6.4 oz)   BMI 27.43 kg/m²     Physical Exam    General:  Alert and oriented, No acute distress.    Eye:  Pupils are equal, round and reactive to light, Normal conjunctiva.    HENT:  Normocephalic,   Neck:  Supple    Respiratory: Respirations are non-labored   Musculoskeletal: Normal ROM and strength  Integumentary:  Warm, Dry, Intact, No pallor, No rash.    Neurologic:  Alert, Oriented, Normal sensory, Cranial Nerves II-XII are grossly intact  Psychiatric:  Cooperative, Appropriate mood & affect.    Assessment/Plan   Fibromyalgia: Increased gabapentin to max dose of 800 mg 3 times daily.  Patient made aware that this is the max dose.    Med list cleaned up due to several duplicates.  Follow-up as scheduled or as needed.  Problem List Items Addressed This Visit    None  Visit Diagnoses       Fibromyalgia    -  Primary    Relevant Medications    gabapentin (Neurontin) 800 mg tablet    DULoxetine (Cymbalta) 60 mg DR capsule            Final diagnoses:   [M79.7] Fibromyalgia      "

## 2024-01-10 ENCOUNTER — PHARMACY VISIT (OUTPATIENT)
Dept: PHARMACY | Facility: CLINIC | Age: 39
End: 2024-01-10
Payer: MEDICAID

## 2024-01-10 ENCOUNTER — TELEPHONE (OUTPATIENT)
Dept: PRIMARY CARE | Facility: CLINIC | Age: 39
End: 2024-01-10
Payer: COMMERCIAL

## 2024-01-10 DIAGNOSIS — B37.9 CANDIDA INFECTION: Primary | ICD-10-CM

## 2024-01-10 PROCEDURE — RXMED WILLOW AMBULATORY MEDICATION CHARGE

## 2024-01-10 RX ORDER — FLUCONAZOLE 200 MG/1
200 TABLET ORAL DAILY
Qty: 3 TABLET | Refills: 0 | Status: SHIPPED | OUTPATIENT
Start: 2024-01-10 | End: 2024-01-13

## 2024-01-10 NOTE — TELEPHONE ENCOUNTER
Patient is asking for a refill on her Diflucan, states having the vaginal discharge and itching x 48 hours and took a hot bath that aggravated it.     Patient also questions if she could get a 200 mg strength.

## 2024-01-18 DIAGNOSIS — F41.8 MIXED ANXIETY AND DEPRESSIVE DISORDER: ICD-10-CM

## 2024-01-18 DIAGNOSIS — F41.0 PANIC ATTACK: ICD-10-CM

## 2024-01-18 RX ORDER — LORAZEPAM 1 MG/1
1 TABLET ORAL EVERY 8 HOURS PRN
Qty: 10 TABLET | Refills: 0 | Status: SHIPPED | OUTPATIENT
Start: 2024-01-18 | End: 2024-01-31 | Stop reason: SDUPTHER

## 2024-01-18 RX ORDER — MIRTAZAPINE 45 MG/1
45 TABLET, FILM COATED ORAL NIGHTLY
Qty: 10 TABLET | Refills: 0 | Status: SHIPPED | OUTPATIENT
Start: 2024-01-18 | End: 2024-01-29 | Stop reason: SDUPTHER

## 2024-01-22 ENCOUNTER — APPOINTMENT (OUTPATIENT)
Dept: PRIMARY CARE | Facility: CLINIC | Age: 39
End: 2024-01-22
Payer: COMMERCIAL

## 2024-01-26 DIAGNOSIS — F41.8 MIXED ANXIETY AND DEPRESSIVE DISORDER: ICD-10-CM

## 2024-01-26 DIAGNOSIS — F41.0 PANIC ATTACK: ICD-10-CM

## 2024-01-26 PROCEDURE — RXMED WILLOW AMBULATORY MEDICATION CHARGE

## 2024-01-26 RX ORDER — MIRTAZAPINE 45 MG/1
45 TABLET, FILM COATED ORAL NIGHTLY
Qty: 30 TABLET | Refills: 11 | Status: SHIPPED | OUTPATIENT
Start: 2024-01-26

## 2024-01-29 ENCOUNTER — TELEPHONE (OUTPATIENT)
Dept: PRIMARY CARE | Facility: CLINIC | Age: 39
End: 2024-01-29
Payer: COMMERCIAL

## 2024-01-29 ENCOUNTER — PHARMACY VISIT (OUTPATIENT)
Dept: PHARMACY | Facility: CLINIC | Age: 39
End: 2024-01-29
Payer: MEDICAID

## 2024-01-29 DIAGNOSIS — G43.009 MIGRAINE WITHOUT AURA AND WITHOUT STATUS MIGRAINOSUS, NOT INTRACTABLE: ICD-10-CM

## 2024-01-29 DIAGNOSIS — F41.8 MIXED ANXIETY AND DEPRESSIVE DISORDER: ICD-10-CM

## 2024-01-29 DIAGNOSIS — F41.0 PANIC ATTACK: ICD-10-CM

## 2024-01-29 RX ORDER — MIRTAZAPINE 45 MG/1
45 TABLET, FILM COATED ORAL NIGHTLY
Qty: 15 TABLET | Refills: 1 | Status: SHIPPED | OUTPATIENT
Start: 2024-01-29 | End: 2024-05-21 | Stop reason: SDUPTHER

## 2024-01-29 RX ORDER — SUMATRIPTAN SUCCINATE 100 MG/1
TABLET ORAL
Qty: 9 TABLET | Refills: 3 | Status: CANCELLED | OUTPATIENT
Start: 2024-01-29

## 2024-01-30 ENCOUNTER — PHARMACY VISIT (OUTPATIENT)
Dept: PHARMACY | Facility: CLINIC | Age: 39
End: 2024-01-30
Payer: MEDICAID

## 2024-01-30 PROCEDURE — RXMED WILLOW AMBULATORY MEDICATION CHARGE

## 2024-01-31 DIAGNOSIS — F41.0 PANIC ATTACK: ICD-10-CM

## 2024-01-31 DIAGNOSIS — F41.8 MIXED ANXIETY AND DEPRESSIVE DISORDER: ICD-10-CM

## 2024-01-31 PROCEDURE — RXMED WILLOW AMBULATORY MEDICATION CHARGE

## 2024-01-31 RX ORDER — LORAZEPAM 1 MG/1
1 TABLET ORAL EVERY 8 HOURS PRN
Qty: 30 TABLET | Refills: 0 | Status: SHIPPED | OUTPATIENT
Start: 2024-01-31 | End: 2024-03-11 | Stop reason: SDUPTHER

## 2024-02-01 ENCOUNTER — PHARMACY VISIT (OUTPATIENT)
Dept: PHARMACY | Facility: CLINIC | Age: 39
End: 2024-02-01
Payer: MEDICAID

## 2024-02-01 PROCEDURE — RXMED WILLOW AMBULATORY MEDICATION CHARGE

## 2024-02-19 PROCEDURE — RXMED WILLOW AMBULATORY MEDICATION CHARGE

## 2024-02-22 ENCOUNTER — PHARMACY VISIT (OUTPATIENT)
Dept: PHARMACY | Facility: CLINIC | Age: 39
End: 2024-02-22
Payer: MEDICAID

## 2024-02-22 PROCEDURE — RXMED WILLOW AMBULATORY MEDICATION CHARGE

## 2024-02-29 PROCEDURE — RXMED WILLOW AMBULATORY MEDICATION CHARGE

## 2024-03-01 ENCOUNTER — PHARMACY VISIT (OUTPATIENT)
Dept: PHARMACY | Facility: CLINIC | Age: 39
End: 2024-03-01
Payer: MEDICAID

## 2024-03-06 ENCOUNTER — PHARMACY VISIT (OUTPATIENT)
Dept: PHARMACY | Facility: CLINIC | Age: 39
End: 2024-03-06
Payer: MEDICAID

## 2024-03-06 PROCEDURE — RXMED WILLOW AMBULATORY MEDICATION CHARGE

## 2024-03-11 DIAGNOSIS — F41.8 MIXED ANXIETY AND DEPRESSIVE DISORDER: ICD-10-CM

## 2024-03-11 DIAGNOSIS — F41.0 PANIC ATTACK: ICD-10-CM

## 2024-03-11 PROCEDURE — RXMED WILLOW AMBULATORY MEDICATION CHARGE

## 2024-03-11 RX ORDER — LORAZEPAM 1 MG/1
1 TABLET ORAL EVERY 8 HOURS PRN
Qty: 30 TABLET | Refills: 0 | Status: SHIPPED | OUTPATIENT
Start: 2024-03-11 | End: 2024-05-21 | Stop reason: SDUPTHER

## 2024-03-21 ENCOUNTER — PHARMACY VISIT (OUTPATIENT)
Dept: PHARMACY | Facility: CLINIC | Age: 39
End: 2024-03-21
Payer: MEDICAID

## 2024-03-21 PROCEDURE — RXMED WILLOW AMBULATORY MEDICATION CHARGE

## 2024-03-29 ENCOUNTER — PHARMACY VISIT (OUTPATIENT)
Dept: PHARMACY | Facility: CLINIC | Age: 39
End: 2024-03-29
Payer: MEDICAID

## 2024-03-29 PROCEDURE — RXMED WILLOW AMBULATORY MEDICATION CHARGE

## 2024-04-08 ENCOUNTER — PHARMACY VISIT (OUTPATIENT)
Dept: PHARMACY | Facility: CLINIC | Age: 39
End: 2024-04-08
Payer: MEDICAID

## 2024-04-08 PROCEDURE — RXMED WILLOW AMBULATORY MEDICATION CHARGE

## 2024-04-18 PROCEDURE — RXMED WILLOW AMBULATORY MEDICATION CHARGE

## 2024-04-19 ENCOUNTER — PHARMACY VISIT (OUTPATIENT)
Dept: PHARMACY | Facility: CLINIC | Age: 39
End: 2024-04-19
Payer: MEDICAID

## 2024-04-19 DIAGNOSIS — M79.7 FIBROMYALGIA: ICD-10-CM

## 2024-04-19 DIAGNOSIS — G43.009 MIGRAINE WITHOUT AURA AND WITHOUT STATUS MIGRAINOSUS, NOT INTRACTABLE: ICD-10-CM

## 2024-04-19 PROCEDURE — RXMED WILLOW AMBULATORY MEDICATION CHARGE

## 2024-04-19 RX ORDER — BUPROPION HYDROCHLORIDE 300 MG/1
300 TABLET ORAL DAILY
Qty: 90 TABLET | Refills: 3 | Status: SHIPPED | OUTPATIENT
Start: 2024-04-19

## 2024-04-29 ENCOUNTER — PHARMACY VISIT (OUTPATIENT)
Dept: PHARMACY | Facility: CLINIC | Age: 39
End: 2024-04-29
Payer: MEDICAID

## 2024-04-29 PROCEDURE — RXMED WILLOW AMBULATORY MEDICATION CHARGE

## 2024-04-30 ENCOUNTER — OFFICE VISIT (OUTPATIENT)
Dept: PRIMARY CARE | Facility: CLINIC | Age: 39
End: 2024-04-30
Payer: COMMERCIAL

## 2024-04-30 VITALS
HEIGHT: 68 IN | DIASTOLIC BLOOD PRESSURE: 82 MMHG | WEIGHT: 188.3 LBS | BODY MASS INDEX: 28.54 KG/M2 | SYSTOLIC BLOOD PRESSURE: 114 MMHG | HEART RATE: 88 BPM

## 2024-04-30 DIAGNOSIS — Z00.00 WELLNESS EXAMINATION: ICD-10-CM

## 2024-04-30 DIAGNOSIS — F41.9 ANXIETY AND DEPRESSION: ICD-10-CM

## 2024-04-30 DIAGNOSIS — L70.0 ACNE VULGARIS: ICD-10-CM

## 2024-04-30 DIAGNOSIS — G43.E09 CHRONIC MIGRAINE WITH AURA WITHOUT STATUS MIGRAINOSUS, NOT INTRACTABLE: Primary | ICD-10-CM

## 2024-04-30 DIAGNOSIS — F31.9 BIPOLAR 1 DISORDER (MULTI): ICD-10-CM

## 2024-04-30 DIAGNOSIS — F32.A ANXIETY AND DEPRESSION: ICD-10-CM

## 2024-04-30 PROBLEM — L70.9 ACNE: Status: ACTIVE | Noted: 2024-04-30

## 2024-04-30 PROBLEM — F99 INSOMNIA DUE TO OTHER MENTAL DISORDER: Status: ACTIVE | Noted: 2023-01-18

## 2024-04-30 PROBLEM — B37.31 CANDIDIASIS OF VAGINA: Status: ACTIVE | Noted: 2024-04-30

## 2024-04-30 PROBLEM — R10.2 CHRONIC PELVIC PAIN IN FEMALE: Status: ACTIVE | Noted: 2024-04-30

## 2024-04-30 PROBLEM — G43.909 MIGRAINE HEADACHE: Status: ACTIVE | Noted: 2024-04-30

## 2024-04-30 PROBLEM — F42.9 OCD (OBSESSIVE COMPULSIVE DISORDER): Status: ACTIVE | Noted: 2019-04-25

## 2024-04-30 PROBLEM — F19.10 SUBSTANCE ABUSE (MULTI): Status: ACTIVE | Noted: 2019-04-25

## 2024-04-30 PROBLEM — R11.2 NAUSEA AND VOMITING: Status: ACTIVE | Noted: 2024-04-30

## 2024-04-30 PROBLEM — F12.90 MARIJUANA USER: Status: ACTIVE | Noted: 2024-04-30

## 2024-04-30 PROBLEM — G89.29 CHRONIC PELVIC PAIN IN FEMALE: Status: ACTIVE | Noted: 2024-04-30

## 2024-04-30 PROBLEM — M79.7 FIBROMYALGIA: Status: ACTIVE | Noted: 2024-04-30

## 2024-04-30 PROBLEM — R61 NIGHT SWEATS: Status: ACTIVE | Noted: 2024-04-30

## 2024-04-30 PROBLEM — G43.909 MIGRAINE HEADACHE: Status: RESOLVED | Noted: 2024-04-30 | Resolved: 2024-04-30

## 2024-04-30 PROBLEM — F51.05 INSOMNIA DUE TO OTHER MENTAL DISORDER: Status: ACTIVE | Noted: 2023-01-18

## 2024-04-30 PROBLEM — G43.909 MIGRAINES: Status: ACTIVE | Noted: 2019-04-25

## 2024-04-30 ASSESSMENT — ENCOUNTER SYMPTOMS
EYE REDNESS: 0
HEADACHES: 0
DIZZINESS: 0
ABDOMINAL PAIN: 0
PALPITATIONS: 0
SORE THROAT: 0
EYE PAIN: 0
SHORTNESS OF BREATH: 0
FEVER: 0
NUMBNESS: 0
FLANK PAIN: 0
CHILLS: 0
SINUS PAIN: 0
HALLUCINATIONS: 0
NAUSEA: 0
COUGH: 0
SINUS PRESSURE: 0
HEMATURIA: 0
CONSTIPATION: 0
VOMITING: 0
DIARRHEA: 0
DIFFICULTY URINATING: 0
FATIGUE: 0
BACK PAIN: 0
EYE ITCHING: 0
COLOR CHANGE: 0
WEAKNESS: 0

## 2024-04-30 NOTE — PATIENT INSTRUCTIONS
-Neutrogena acne body wash cleanser for cystic acne, wash with that daily  -Apply altreno cream to the specific cysts, be sure to wear sunscreen when outside. If no improvement with the cysts send me a message  
normal...

## 2024-04-30 NOTE — PROGRESS NOTES
Subjective   Patient ID: Nuzhat Saenz is a 39 y.o. female who presents for Fibromyalgia, Cyst (Has cysts that pop up all over her body and wants to discuss this), and Migraine (Wants to discuss possible botox).    HPI   Cysts: on forehead and side above hip, talked about neutrogena acne body wash for the cysts on her body, also discussed retinol cream which she already uses, and spot treat with that  Migraines: 10 + migraines a month, lasting 2-3 days at time, reports she is getting auras with them and dizzy spells. She takes her imitrex and it does help sometimes but you have to take it at a certain time. Taking reglan imitrex and butalbital-acetaminophen-caffeine. She starts with imitrex if it doesn't work then moves onto the others. Pt would like to try botox first then will let us know if it doesn't work and we can discuss other options.   Anxiety  Pt reports her mother and grandmother  recently, mother passed due to a sudden heart attack randomly. She would like to know if there is any cardiac screening she can have done as she iss very anxious about this. Discussed the blood work that was ordered and how that is a good overview, then we can evaluate from there. She denies SOB, chest pain, palpitations, or fatigue.     Review of Systems   Constitutional:  Negative for chills, fatigue and fever.   HENT:  Negative for congestion, sinus pressure, sinus pain and sore throat.    Eyes:  Negative for pain, redness and itching.   Respiratory:  Negative for cough and shortness of breath.    Cardiovascular:  Negative for chest pain, palpitations and leg swelling.   Gastrointestinal:  Negative for abdominal pain, constipation, diarrhea, nausea and vomiting.   Endocrine: Negative for cold intolerance and heat intolerance.   Genitourinary:  Negative for difficulty urinating, flank pain and hematuria.   Musculoskeletal:  Negative for back pain and gait problem.   Skin:  Negative for color change.  "  Neurological:  Negative for dizziness, weakness, numbness and headaches.   Psychiatric/Behavioral:  Negative for hallucinations and suicidal ideas.        Objective   /82 (Patient Position: Sitting)   Pulse 88   Ht 1.727 m (5' 8\")   Wt 85.4 kg (188 lb 4.8 oz)   BMI 28.63 kg/m²     Physical Exam  Vitals and nursing note reviewed.   Constitutional:       Appearance: Normal appearance.   HENT:      Right Ear: Tympanic membrane normal.      Nose: Nose normal.   Eyes:      Extraocular Movements: Extraocular movements intact.      Pupils: Pupils are equal, round, and reactive to light.   Cardiovascular:      Rate and Rhythm: Normal rate and regular rhythm.   Pulmonary:      Effort: Pulmonary effort is normal.      Breath sounds: Normal breath sounds.   Abdominal:      General: Abdomen is flat. Bowel sounds are normal.      Palpations: Abdomen is soft.   Musculoskeletal:      Cervical back: Normal range of motion.   Skin:     General: Skin is warm and dry.      Capillary Refill: Capillary refill takes less than 2 seconds.   Neurological:      Mental Status: She is alert and oriented to person, place, and time.   Psychiatric:         Mood and Affect: Mood normal.         Behavior: Behavior normal.         Assessment/Plan   Problem List Items Addressed This Visit             ICD-10-CM       Mental Health     -Continue current medications as she is tolerating this regimen         Bipolar 1 disorder (Multi) F31.9    Anxiety and depression F41.9, F32.A       Neuro     -Pt wants to try botox injections, then discuss other medication options if there is no relief  -Encouraged to get bloodwork ordered from last summer         Migraines - Primary G43.909       Skin     -Neutrogena body wash for body acne, altreno cream to spot treat          Acne L70.9     Other Visit Diagnoses         Codes    Wellness examination     Z00.00               "

## 2024-04-30 NOTE — ASSESSMENT & PLAN NOTE
-Pt wants to try botox injections, then discuss other medication options if there is no relief  -Encouraged to get bloodwork ordered from last summer

## 2024-05-15 ENCOUNTER — PHARMACY VISIT (OUTPATIENT)
Dept: PHARMACY | Facility: CLINIC | Age: 39
End: 2024-05-15
Payer: MEDICAID

## 2024-05-15 ENCOUNTER — TELEPHONE (OUTPATIENT)
Dept: PRIMARY CARE | Facility: CLINIC | Age: 39
End: 2024-05-15
Payer: COMMERCIAL

## 2024-05-15 DIAGNOSIS — G43.009 MIGRAINE WITHOUT AURA AND WITHOUT STATUS MIGRAINOSUS, NOT INTRACTABLE: ICD-10-CM

## 2024-05-15 PROCEDURE — RXMED WILLOW AMBULATORY MEDICATION CHARGE

## 2024-05-15 RX ORDER — BUTALBITAL, ACETAMINOPHEN AND CAFFEINE 50; 325; 40 MG/1; MG/1; MG/1
1 TABLET ORAL EVERY 4 HOURS PRN
Qty: 30 TABLET | Refills: 1 | Status: SHIPPED | OUTPATIENT
Start: 2024-05-15

## 2024-05-15 RX ORDER — SUMATRIPTAN SUCCINATE 100 MG/1
TABLET ORAL
Qty: 9 TABLET | Refills: 3 | Status: SHIPPED | OUTPATIENT
Start: 2024-05-15

## 2024-05-15 RX ORDER — BUTALBITAL, ACETAMINOPHEN AND CAFFEINE 50; 325; 40 MG/1; MG/1; MG/1
1 TABLET ORAL EVERY 4 HOURS PRN
Qty: 30 TABLET | Refills: 1 | Status: SHIPPED | OUTPATIENT
Start: 2024-05-15 | End: 2024-05-15 | Stop reason: SDUPTHER

## 2024-05-15 RX ORDER — SUMATRIPTAN SUCCINATE 100 MG/1
TABLET ORAL
Qty: 9 TABLET | Refills: 3 | Status: SHIPPED | OUTPATIENT
Start: 2024-05-15 | End: 2024-05-15 | Stop reason: SDUPTHER

## 2024-05-16 ENCOUNTER — TELEMEDICINE (OUTPATIENT)
Dept: PRIMARY CARE | Facility: CLINIC | Age: 39
End: 2024-05-16
Payer: COMMERCIAL

## 2024-05-16 DIAGNOSIS — F41.9 ANXIETY: Primary | ICD-10-CM

## 2024-05-16 ASSESSMENT — ENCOUNTER SYMPTOMS
NERVOUS/ANXIOUS: 1
ACTIVITY CHANGE: 0

## 2024-05-16 NOTE — PROGRESS NOTES
Subjective   Patient ID: Nuzhat Saenz is a 39 y.o. female who presents for No chief complaint on file..    HPI   -telephone call name and birth date verified over the phone. She is requesting that we renew her medical marijuana card, as it has  and in order for her to renew it it is $150.00. I did explain that  does not have prescribers write scripts for medical marijuana and encouraged to seek a script from her original prescriber. She denies any increased anxiety or depression, just wanted advice on how to obtain her script.    Review of Systems   Constitutional:  Negative for activity change.   Psychiatric/Behavioral:  The patient is nervous/anxious.      Objective   There were no vitals taken for this visit.    Physical Exam    Assessment/Plan   Problem List Items Addressed This Visit    None  Visit Diagnoses         Codes    Anxiety    -  Primary F41.9

## 2024-05-21 ENCOUNTER — TELEMEDICINE (OUTPATIENT)
Dept: PRIMARY CARE | Facility: CLINIC | Age: 39
End: 2024-05-21
Payer: COMMERCIAL

## 2024-05-21 ENCOUNTER — PHARMACY VISIT (OUTPATIENT)
Dept: PHARMACY | Facility: CLINIC | Age: 39
End: 2024-05-21
Payer: MEDICAID

## 2024-05-21 ENCOUNTER — TELEPHONE (OUTPATIENT)
Dept: PRIMARY CARE | Facility: CLINIC | Age: 39
End: 2024-05-21

## 2024-05-21 DIAGNOSIS — F41.8 MIXED ANXIETY AND DEPRESSIVE DISORDER: ICD-10-CM

## 2024-05-21 DIAGNOSIS — F41.0 PANIC ATTACK: ICD-10-CM

## 2024-05-21 PROCEDURE — 99214 OFFICE O/P EST MOD 30 MIN: CPT

## 2024-05-21 PROCEDURE — RXMED WILLOW AMBULATORY MEDICATION CHARGE

## 2024-05-21 RX ORDER — LORAZEPAM 1 MG/1
1 TABLET ORAL EVERY 8 HOURS PRN
Qty: 30 TABLET | Refills: 0 | Status: SHIPPED | OUTPATIENT
Start: 2024-05-21 | End: 2024-08-19

## 2024-05-21 ASSESSMENT — ENCOUNTER SYMPTOMS
NERVOUS/ANXIOUS: 1
SLEEP DISTURBANCE: 1
FATIGUE: 1
APPETITE CHANGE: 0

## 2024-05-21 NOTE — TELEPHONE ENCOUNTER
Pt called requesting a refill on her ativan. Stated that she is not working because her anxiety is so high and she is shutting down at work.  Asking for a call back regarding this as she had some big issues happen over the weekend with her kid she wanted to discuss.
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
## 2024-05-29 ENCOUNTER — HOSPITAL ENCOUNTER (EMERGENCY)
Facility: HOSPITAL | Age: 39
Discharge: HOME | End: 2024-05-29
Payer: COMMERCIAL

## 2024-05-29 VITALS
RESPIRATION RATE: 18 BRPM | BODY MASS INDEX: 28.49 KG/M2 | OXYGEN SATURATION: 96 % | WEIGHT: 188 LBS | HEART RATE: 87 BPM | DIASTOLIC BLOOD PRESSURE: 79 MMHG | TEMPERATURE: 98.3 F | SYSTOLIC BLOOD PRESSURE: 115 MMHG | HEIGHT: 68 IN

## 2024-05-29 DIAGNOSIS — S61.258A ANIMAL BITE OF INDEX FINGER, INITIAL ENCOUNTER: Primary | ICD-10-CM

## 2024-05-29 PROCEDURE — 90675 RABIES VACCINE IM: CPT | Mod: JZ | Performed by: NURSE PRACTITIONER

## 2024-05-29 PROCEDURE — 99283 EMERGENCY DEPT VISIT LOW MDM: CPT | Mod: 25

## 2024-05-29 PROCEDURE — 2500000001 HC RX 250 WO HCPCS SELF ADMINISTERED DRUGS (ALT 637 FOR MEDICARE OP): Performed by: NURSE PRACTITIONER

## 2024-05-29 PROCEDURE — 90375 RABIES IG IM/SC: CPT | Mod: JW | Performed by: NURSE PRACTITIONER

## 2024-05-29 PROCEDURE — 90471 IMMUNIZATION ADMIN: CPT | Performed by: NURSE PRACTITIONER

## 2024-05-29 PROCEDURE — 2500000004 HC RX 250 GENERAL PHARMACY W/ HCPCS (ALT 636 FOR OP/ED): Mod: JW | Performed by: NURSE PRACTITIONER

## 2024-05-29 RX ORDER — LORAZEPAM 1 MG/1
1 TABLET ORAL ONCE
Status: COMPLETED | OUTPATIENT
Start: 2024-05-29 | End: 2024-05-29

## 2024-05-29 RX ADMIN — RABIES IMMUNE GLOBULIN (HUMAN) 1710 UNITS: 300 INJECTION, SOLUTION INFILTRATION; INTRAMUSCULAR at 17:13

## 2024-05-29 RX ADMIN — LORAZEPAM 1 MG: 1 TABLET ORAL at 16:29

## 2024-05-29 RX ADMIN — Medication 1 ML: at 17:19

## 2024-05-29 ASSESSMENT — COLUMBIA-SUICIDE SEVERITY RATING SCALE - C-SSRS
6. HAVE YOU EVER DONE ANYTHING, STARTED TO DO ANYTHING, OR PREPARED TO DO ANYTHING TO END YOUR LIFE?: NO
2. HAVE YOU ACTUALLY HAD ANY THOUGHTS OF KILLING YOURSELF?: NO
1. IN THE PAST MONTH, HAVE YOU WISHED YOU WERE DEAD OR WISHED YOU COULD GO TO SLEEP AND NOT WAKE UP?: NO

## 2024-05-29 ASSESSMENT — PAIN - FUNCTIONAL ASSESSMENT
PAIN_FUNCTIONAL_ASSESSMENT: 0-10
PAIN_FUNCTIONAL_ASSESSMENT: 0-10

## 2024-05-29 ASSESSMENT — PAIN DESCRIPTION - LOCATION: LOCATION: FINGER (COMMENT WHICH ONE)

## 2024-05-29 ASSESSMENT — PAIN SCALES - GENERAL
PAINLEVEL_OUTOF10: 1
PAINLEVEL_OUTOF10: 1

## 2024-05-29 ASSESSMENT — PAIN DESCRIPTION - DESCRIPTORS: DESCRIPTORS: ACHING

## 2024-05-29 ASSESSMENT — PAIN DESCRIPTION - PAIN TYPE: TYPE: ACUTE PAIN

## 2024-05-29 ASSESSMENT — PAIN DESCRIPTION - ORIENTATION: ORIENTATION: RIGHT

## 2024-05-29 NOTE — ED PROVIDER NOTES
Chief Complaint   Patient presents with    Animal Bite     Raccoon bite 40 mins PTA. Right index finger open wound        Patient History    Past Medical History:   Diagnosis Date    Anxiety     Calculus of kidney     Recurrent kidney stones    Depression     Fibromyalgia     Interstitial cystitis     Migraines     OCD (obsessive compulsive disorder)     Other specified anxiety disorders     Depression with anxiety    Other specified diseases of liver     Intrahepatic cholestasis    Personal history of diseases of the blood and blood-forming organs and certain disorders involving the immune mechanism     History of anemia    Personal history of other diseases of the nervous system and sense organs     History of eustachian tube dysfunction    Personal history of other diseases of the respiratory system     History of sinusitis    Personal history of other diseases of urinary system     History of kidney disease    Personal history of other mental and behavioral disorders     History of panic attacks    Personal history of urinary (tract) infections     History of cystitis      Past Surgical History:   Procedure Laterality Date    APPENDECTOMY  2016    Appendectomy    BREAST LUMPECTOMY  2016    Breast Surgery Lumpectomy     SECTION, CLASSIC  2016     Section    GALLBLADDER SURGERY  2016    Gallbladder Surgery    MYRINGOTOMY W/ TUBES      OTHER SURGICAL HISTORY  2016    Ureterotomy For Insertion Of Stent    OTHER SURGICAL HISTORY  2020    Dental surgery    WISDOM TOOTH EXTRACTION        Family History   Problem Relation Name Age of Onset    Dementia Mother      Dementia Father        Social History     Social History Narrative    ** Merged History Encounter **           Allergies   Allergen Reactions    Sulfa (Sulfonamide Antibiotics) Hives and Other     Pt says she gets sores on her tongue    Codeine GI Upset    Macrobid [Nitrofurantoin Monohyd/M-Cryst] Other      Tongue irritation.        PMH: Reviewed  PSH: Reviewed  Social History: Reviewed.   Allergies reviewed.     HPI: Nuzhat Saenz is a right-handed 39 y.o. female who presents to the ED today unaccompanied with complaints of raccoon bite to right 2nd finger. She rescues wild animals frequently. She got a call today and went to get the raccoon out of a dumpster. It bit her and scratched her right index finger. Tetanus is up to date.    PHYSICAL EXAM:    GENERAL: Vitals noted, no distress. Alert and oriented x 3. Non-toxic.       HEAD: Normocephalic, atraumatic.     NECK: Supple. No midline or paraspinal tenderness through full range of motion.      CARDIAC: Regular rate, rhythm. No murmurs or rubs.    RESPIRATORY: Lungs clear and equal bilaterally. No respiratory distress.     MUSCULOSKELETAL & SKIN:  Warm, dry, and intact except for a small puncture wound on the right index finger, distal phalanx, dorsal and volar aspects and an abrasion/scratch on the dorsal right index finger. No rash/lesions. No peripheral edema.     NEURO: No focal neurologic deficits, acting appropriately.     Labs Reviewed - No data to display     No orders to display        Medical Decision Making         ED COURSE: This patient was seen and examined by myself independently. She's given PO ativan for anxiety prior to IM injections. Per UpToDate recommendations, she has not been previously immunized for rabies, and is given IM rabies vaccine and IM immune globulin here in the ED. She's set up for days 3, 7, and 14 for additional injections of rabies vaccine through the infusion center. Follow up care with PCP in 2-3 days for wound check. She is discharged home in a stable condition with computer instructions given and is encouraged to return to the ER for any new or worsening symptoms.       DIAGNOSTIC IMPRESSION: #1 animal bite right index finger     Nelsy Colin, CALI-CNP  05/29/24 8090

## 2024-05-30 DIAGNOSIS — Z23 NEED FOR IMMUNIZATION AGAINST RABIES: Primary | ICD-10-CM

## 2024-05-30 RX ORDER — HEPARIN SODIUM,PORCINE/PF 10 UNIT/ML
50 SYRINGE (ML) INTRAVENOUS AS NEEDED
Status: CANCELLED | OUTPATIENT
Start: 2024-06-01

## 2024-05-30 RX ORDER — DIPHENHYDRAMINE HYDROCHLORIDE 50 MG/ML
50 INJECTION INTRAMUSCULAR; INTRAVENOUS AS NEEDED
Status: CANCELLED | OUTPATIENT
Start: 2024-06-01

## 2024-05-30 RX ORDER — HEPARIN 100 UNIT/ML
500 SYRINGE INTRAVENOUS AS NEEDED
Status: CANCELLED | OUTPATIENT
Start: 2024-06-01

## 2024-05-30 RX ORDER — ALBUTEROL SULFATE 0.83 MG/ML
3 SOLUTION RESPIRATORY (INHALATION) AS NEEDED
Status: CANCELLED | OUTPATIENT
Start: 2024-06-01

## 2024-05-30 RX ORDER — FAMOTIDINE 10 MG/ML
20 INJECTION INTRAVENOUS ONCE AS NEEDED
Status: CANCELLED | OUTPATIENT
Start: 2024-06-01

## 2024-05-30 RX ORDER — EPINEPHRINE 0.3 MG/.3ML
0.3 INJECTION SUBCUTANEOUS EVERY 5 MIN PRN
Status: CANCELLED | OUTPATIENT
Start: 2024-06-01

## 2024-05-31 ENCOUNTER — PHARMACY VISIT (OUTPATIENT)
Dept: PHARMACY | Facility: CLINIC | Age: 39
End: 2024-05-31
Payer: MEDICAID

## 2024-05-31 PROCEDURE — RXMED WILLOW AMBULATORY MEDICATION CHARGE

## 2024-06-03 ENCOUNTER — INFUSION (OUTPATIENT)
Dept: HEMATOLOGY/ONCOLOGY | Facility: CLINIC | Age: 39
End: 2024-06-03
Payer: COMMERCIAL

## 2024-06-03 VITALS
BODY MASS INDEX: 29.36 KG/M2 | HEART RATE: 89 BPM | WEIGHT: 193.12 LBS | RESPIRATION RATE: 18 BRPM | DIASTOLIC BLOOD PRESSURE: 88 MMHG | SYSTOLIC BLOOD PRESSURE: 135 MMHG | TEMPERATURE: 97.5 F | OXYGEN SATURATION: 98 %

## 2024-06-03 DIAGNOSIS — Z23 NEED FOR IMMUNIZATION AGAINST RABIES: ICD-10-CM

## 2024-06-03 PROCEDURE — 90675 RABIES VACCINE IM: CPT | Mod: JZ

## 2024-06-03 PROCEDURE — 90471 IMMUNIZATION ADMIN: CPT

## 2024-06-03 PROCEDURE — 2500000004 HC RX 250 GENERAL PHARMACY W/ HCPCS (ALT 636 FOR OP/ED): Mod: JZ

## 2024-06-03 RX ORDER — ALBUTEROL SULFATE 0.83 MG/ML
3 SOLUTION RESPIRATORY (INHALATION) AS NEEDED
Status: CANCELLED | OUTPATIENT
Start: 2024-06-05

## 2024-06-03 RX ORDER — HEPARIN SODIUM,PORCINE/PF 10 UNIT/ML
50 SYRINGE (ML) INTRAVENOUS AS NEEDED
OUTPATIENT
Start: 2024-06-03

## 2024-06-03 RX ORDER — FAMOTIDINE 10 MG/ML
20 INJECTION INTRAVENOUS ONCE AS NEEDED
Status: CANCELLED | OUTPATIENT
Start: 2024-06-05

## 2024-06-03 RX ORDER — EPINEPHRINE 0.3 MG/.3ML
0.3 INJECTION SUBCUTANEOUS EVERY 5 MIN PRN
Status: CANCELLED | OUTPATIENT
Start: 2024-06-05

## 2024-06-03 RX ORDER — DIPHENHYDRAMINE HYDROCHLORIDE 50 MG/ML
50 INJECTION INTRAMUSCULAR; INTRAVENOUS AS NEEDED
Status: CANCELLED | OUTPATIENT
Start: 2024-06-05

## 2024-06-03 RX ORDER — HEPARIN 100 UNIT/ML
500 SYRINGE INTRAVENOUS AS NEEDED
OUTPATIENT
Start: 2024-06-03

## 2024-06-03 RX ADMIN — RABIES VACCINE 1 ML: KIT at 10:24

## 2024-06-03 ASSESSMENT — ENCOUNTER SYMPTOMS
LOSS OF SENSATION IN FEET: 0
OCCASIONAL FEELINGS OF UNSTEADINESS: 0
DEPRESSION: 0

## 2024-06-03 ASSESSMENT — COLUMBIA-SUICIDE SEVERITY RATING SCALE - C-SSRS
1. IN THE PAST MONTH, HAVE YOU WISHED YOU WERE DEAD OR WISHED YOU COULD GO TO SLEEP AND NOT WAKE UP?: NO
2. HAVE YOU ACTUALLY HAD ANY THOUGHTS OF KILLING YOURSELF?: NO
6. HAVE YOU EVER DONE ANYTHING, STARTED TO DO ANYTHING, OR PREPARED TO DO ANYTHING TO END YOUR LIFE?: NO

## 2024-06-03 ASSESSMENT — PATIENT HEALTH QUESTIONNAIRE - PHQ9
2. FEELING DOWN, DEPRESSED OR HOPELESS: NOT AT ALL
1. LITTLE INTEREST OR PLEASURE IN DOING THINGS: NOT AT ALL
SUM OF ALL RESPONSES TO PHQ9 QUESTIONS 1 AND 2: 0

## 2024-06-03 ASSESSMENT — PAIN SCALES - GENERAL: PAINLEVEL: 0-NO PAIN

## 2024-06-04 ENCOUNTER — OFFICE VISIT (OUTPATIENT)
Dept: PRIMARY CARE | Facility: CLINIC | Age: 39
End: 2024-06-04
Payer: COMMERCIAL

## 2024-06-04 VITALS
BODY MASS INDEX: 29.4 KG/M2 | HEART RATE: 62 BPM | DIASTOLIC BLOOD PRESSURE: 84 MMHG | SYSTOLIC BLOOD PRESSURE: 122 MMHG | HEIGHT: 68 IN | WEIGHT: 194 LBS

## 2024-06-04 DIAGNOSIS — F41.9 ANXIETY: Primary | ICD-10-CM

## 2024-06-04 PROCEDURE — 1036F TOBACCO NON-USER: CPT

## 2024-06-04 PROCEDURE — 99213 OFFICE O/P EST LOW 20 MIN: CPT

## 2024-06-04 PROCEDURE — RXMED WILLOW AMBULATORY MEDICATION CHARGE

## 2024-06-04 ASSESSMENT — ENCOUNTER SYMPTOMS
NUMBNESS: 0
FEVER: 0
NECK PAIN: 0
CONSTIPATION: 0
NAUSEA: 0
DIZZINESS: 0
HEADACHES: 1
FATIGUE: 0
CHILLS: 0
DIARRHEA: 0
LIGHT-HEADEDNESS: 0
NECK STIFFNESS: 0

## 2024-06-04 NOTE — PROGRESS NOTES
"Subjective   Patient ID: Nuzhat Saenz is a 39 y.o. female who presents for ER Follow-up (Right index finger).    HPI   -Was bit by a raccoon and went to the ER, she was examined and set up on a rabies vaccine series. Her bite marks are scabbed over and healing well. Negative for erythema, joint pain, or redness.   -Migraines reports she is having about 4 a week still despite her current medication. Will give samples of nurtec and ubrelvy to see if these medications will work over her imitrex.   -Anxiety does report it is still pretty bad due to life situations with her son and , she is managing well with her current medications. '    Review of Systems   Constitutional:  Negative for chills, fatigue and fever.   Gastrointestinal:  Negative for constipation, diarrhea and nausea.   Musculoskeletal:  Negative for neck pain and neck stiffness.   Neurological:  Positive for headaches. Negative for dizziness, light-headedness and numbness.       Objective   /84 (Patient Position: Sitting)   Pulse 62   Ht 1.727 m (5' 8\")   Wt 88 kg (194 lb)   BMI 29.50 kg/m²     Physical Exam  Constitutional:       Appearance: Normal appearance.   Skin:     Capillary Refill: Capillary refill takes less than 2 seconds.   Neurological:      General: No focal deficit present.      Mental Status: She is alert and oriented to person, place, and time.   Psychiatric:         Mood and Affect: Mood normal.         Behavior: Behavior normal.       Assessment/Plan   Problem List Items Addressed This Visit    None         "

## 2024-06-05 ENCOUNTER — PHARMACY VISIT (OUTPATIENT)
Dept: PHARMACY | Facility: CLINIC | Age: 39
End: 2024-06-05
Payer: MEDICAID

## 2024-06-05 ENCOUNTER — INFUSION (OUTPATIENT)
Dept: HEMATOLOGY/ONCOLOGY | Facility: CLINIC | Age: 39
End: 2024-06-05
Payer: COMMERCIAL

## 2024-06-05 VITALS
RESPIRATION RATE: 18 BRPM | OXYGEN SATURATION: 97 % | DIASTOLIC BLOOD PRESSURE: 75 MMHG | WEIGHT: 195.4 LBS | HEART RATE: 75 BPM | TEMPERATURE: 97.9 F | SYSTOLIC BLOOD PRESSURE: 107 MMHG | BODY MASS INDEX: 29.71 KG/M2

## 2024-06-05 DIAGNOSIS — Z23 NEED FOR IMMUNIZATION AGAINST RABIES: ICD-10-CM

## 2024-06-05 PROCEDURE — 2500000004 HC RX 250 GENERAL PHARMACY W/ HCPCS (ALT 636 FOR OP/ED): Mod: JZ | Performed by: NURSE PRACTITIONER

## 2024-06-05 PROCEDURE — 90471 IMMUNIZATION ADMIN: CPT | Performed by: NURSE PRACTITIONER

## 2024-06-05 PROCEDURE — 90675 RABIES VACCINE IM: CPT | Mod: JZ | Performed by: NURSE PRACTITIONER

## 2024-06-05 RX ORDER — DIPHENHYDRAMINE HYDROCHLORIDE 50 MG/ML
50 INJECTION INTRAMUSCULAR; INTRAVENOUS AS NEEDED
OUTPATIENT
Start: 2024-06-12

## 2024-06-05 RX ORDER — FAMOTIDINE 10 MG/ML
20 INJECTION INTRAVENOUS ONCE AS NEEDED
OUTPATIENT
Start: 2024-06-12

## 2024-06-05 RX ORDER — EPINEPHRINE 0.3 MG/.3ML
0.3 INJECTION SUBCUTANEOUS EVERY 5 MIN PRN
OUTPATIENT
Start: 2024-06-12

## 2024-06-05 RX ORDER — ALBUTEROL SULFATE 0.83 MG/ML
3 SOLUTION RESPIRATORY (INHALATION) AS NEEDED
OUTPATIENT
Start: 2024-06-12

## 2024-06-05 RX ADMIN — RABIES VACCINE 1 ML: KIT at 13:38

## 2024-06-05 ASSESSMENT — PAIN SCALES - GENERAL: PAINLEVEL: 0-NO PAIN

## 2024-06-12 ENCOUNTER — INFUSION (OUTPATIENT)
Dept: HEMATOLOGY/ONCOLOGY | Facility: CLINIC | Age: 39
End: 2024-06-12
Payer: COMMERCIAL

## 2024-06-12 VITALS
TEMPERATURE: 98.2 F | BODY MASS INDEX: 29.63 KG/M2 | OXYGEN SATURATION: 98 % | SYSTOLIC BLOOD PRESSURE: 110 MMHG | HEART RATE: 72 BPM | RESPIRATION RATE: 18 BRPM | WEIGHT: 194.89 LBS | DIASTOLIC BLOOD PRESSURE: 73 MMHG

## 2024-06-12 DIAGNOSIS — Z23 NEED FOR IMMUNIZATION AGAINST RABIES: ICD-10-CM

## 2024-06-12 PROCEDURE — 2500000004 HC RX 250 GENERAL PHARMACY W/ HCPCS (ALT 636 FOR OP/ED): Mod: JZ | Performed by: NURSE PRACTITIONER

## 2024-06-12 PROCEDURE — 90471 IMMUNIZATION ADMIN: CPT | Performed by: NURSE PRACTITIONER

## 2024-06-12 PROCEDURE — 96372 THER/PROPH/DIAG INJ SC/IM: CPT

## 2024-06-12 PROCEDURE — 90675 RABIES VACCINE IM: CPT | Mod: JZ | Performed by: NURSE PRACTITIONER

## 2024-06-12 RX ORDER — ALBUTEROL SULFATE 0.83 MG/ML
3 SOLUTION RESPIRATORY (INHALATION) AS NEEDED
OUTPATIENT
Start: 2024-06-12

## 2024-06-12 RX ORDER — EPINEPHRINE 0.3 MG/.3ML
0.3 INJECTION SUBCUTANEOUS EVERY 5 MIN PRN
OUTPATIENT
Start: 2024-06-12

## 2024-06-12 RX ORDER — DIPHENHYDRAMINE HYDROCHLORIDE 50 MG/ML
50 INJECTION INTRAMUSCULAR; INTRAVENOUS AS NEEDED
OUTPATIENT
Start: 2024-06-12

## 2024-06-12 RX ORDER — FAMOTIDINE 10 MG/ML
20 INJECTION INTRAVENOUS ONCE AS NEEDED
OUTPATIENT
Start: 2024-06-12

## 2024-06-18 ENCOUNTER — TELEPHONE (OUTPATIENT)
Dept: PRIMARY CARE | Facility: CLINIC | Age: 39
End: 2024-06-18
Payer: COMMERCIAL

## 2024-06-18 NOTE — TELEPHONE ENCOUNTER
Pt is having issues sleeping. The mirtazapine helps her fall asleep but she doesn't stay asleep. She started doubling the medication and still wakes at the same time. Even with adjusting her sleep schedule she still wakes at the same time.  Asking what to do and if she needs an appointment.

## 2024-06-18 NOTE — TELEPHONE ENCOUNTER
Have her schedule a phone apt please, she doesn't need to come in but it's more than just a quick phone call

## 2024-06-20 ENCOUNTER — TELEMEDICINE (OUTPATIENT)
Dept: PRIMARY CARE | Facility: CLINIC | Age: 39
End: 2024-06-20
Payer: COMMERCIAL

## 2024-06-20 DIAGNOSIS — G43.009 MIGRAINE WITHOUT AURA AND WITHOUT STATUS MIGRAINOSUS, NOT INTRACTABLE: Primary | ICD-10-CM

## 2024-06-20 PROCEDURE — 1036F TOBACCO NON-USER: CPT

## 2024-06-20 PROCEDURE — 99214 OFFICE O/P EST MOD 30 MIN: CPT

## 2024-06-20 NOTE — PROGRESS NOTES
Telephone visit, name and  confirmed. She is concerned today due to sleep issues, she reports she's sleeping for three hours then waking up.   We did discuss due to the amount of medication she already takes it is not reasonable to add another one. I we discussed sleep hygiene, limiting caffeine, and screen time before bed, and getting out of bed when she cannot fall asleep. She is going to try this over the next week, if no improvement will consider changing her Remeron.  Samples of Nurtec given at last visit, she does report this medication worked better than her Imitrex. Will prescribe this as an abortive migraine medication in replacement of her Imitrex.

## 2024-06-21 PROCEDURE — RXMED WILLOW AMBULATORY MEDICATION CHARGE

## 2024-06-23 PROCEDURE — RXMED WILLOW AMBULATORY MEDICATION CHARGE

## 2024-06-24 ENCOUNTER — PHARMACY VISIT (OUTPATIENT)
Dept: PHARMACY | Facility: CLINIC | Age: 39
End: 2024-06-24
Payer: MEDICAID

## 2024-06-29 ENCOUNTER — PHARMACY VISIT (OUTPATIENT)
Dept: PHARMACY | Facility: CLINIC | Age: 39
End: 2024-06-29
Payer: MEDICAID

## 2024-06-29 PROCEDURE — RXMED WILLOW AMBULATORY MEDICATION CHARGE

## 2024-07-02 ENCOUNTER — TELEPHONE (OUTPATIENT)
Dept: PRIMARY CARE | Facility: CLINIC | Age: 39
End: 2024-07-02
Payer: COMMERCIAL

## 2024-07-02 ENCOUNTER — PHARMACY VISIT (OUTPATIENT)
Dept: PHARMACY | Facility: CLINIC | Age: 39
End: 2024-07-02
Payer: MEDICAID

## 2024-07-02 DIAGNOSIS — B37.9 YEAST INFECTION: Primary | ICD-10-CM

## 2024-07-02 PROCEDURE — RXMED WILLOW AMBULATORY MEDICATION CHARGE

## 2024-07-02 RX ORDER — FLUCONAZOLE 150 MG/1
150 TABLET ORAL ONCE
Qty: 2 TABLET | Refills: 0 | Status: SHIPPED | OUTPATIENT
Start: 2024-07-02 | End: 2024-07-04

## 2024-07-02 NOTE — TELEPHONE ENCOUNTER
Pt called for a referral for a GYN - thinks she has a yeast infection but also needs a pap. Asking if while she is trying to get into the GYN she can get a script for [facility]

## 2024-07-04 DIAGNOSIS — Z11.3 SCREENING FOR STD (SEXUALLY TRANSMITTED DISEASE): Primary | ICD-10-CM

## 2024-07-04 DIAGNOSIS — G43.009 MIGRAINE WITHOUT AURA AND WITHOUT STATUS MIGRAINOSUS, NOT INTRACTABLE: ICD-10-CM

## 2024-07-04 DIAGNOSIS — Z32.00 ENCOUNTER FOR PREGNANCY TEST, RESULT UNKNOWN: ICD-10-CM

## 2024-07-04 RX ORDER — BUTALBITAL, ACETAMINOPHEN AND CAFFEINE 50; 325; 40 MG/1; MG/1; MG/1
1 TABLET ORAL EVERY 4 HOURS PRN
Qty: 30 TABLET | Refills: 1 | Status: CANCELLED | OUTPATIENT
Start: 2024-07-04

## 2024-07-05 ENCOUNTER — LAB (OUTPATIENT)
Dept: LAB | Facility: LAB | Age: 39
End: 2024-07-05
Payer: COMMERCIAL

## 2024-07-05 DIAGNOSIS — G43.009 MIGRAINE WITHOUT AURA AND WITHOUT STATUS MIGRAINOSUS, NOT INTRACTABLE: ICD-10-CM

## 2024-07-05 DIAGNOSIS — F51.04 PSYCHOPHYSIOLOGICAL INSOMNIA: ICD-10-CM

## 2024-07-05 DIAGNOSIS — Z32.00 ENCOUNTER FOR PREGNANCY TEST, RESULT UNKNOWN: ICD-10-CM

## 2024-07-05 DIAGNOSIS — Z11.3 SCREENING FOR STD (SEXUALLY TRANSMITTED DISEASE): ICD-10-CM

## 2024-07-05 DIAGNOSIS — F41.0 PANIC ATTACK: ICD-10-CM

## 2024-07-05 DIAGNOSIS — F41.8 MIXED ANXIETY AND DEPRESSIVE DISORDER: ICD-10-CM

## 2024-07-05 LAB
25(OH)D3 SERPL-MCNC: 40 NG/ML (ref 30–100)
ALBUMIN SERPL BCP-MCNC: 4.3 G/DL (ref 3.4–5)
ALP SERPL-CCNC: 73 U/L (ref 33–110)
ALT SERPL W P-5'-P-CCNC: 10 U/L (ref 7–45)
ANION GAP SERPL CALC-SCNC: 11 MMOL/L (ref 10–20)
APPEARANCE UR: CLEAR
AST SERPL W P-5'-P-CCNC: 14 U/L (ref 9–39)
B-HCG SERPL-ACNC: <2 MIU/ML
BILIRUB DIRECT SERPL-MCNC: 0.1 MG/DL (ref 0–0.3)
BILIRUB SERPL-MCNC: 0.4 MG/DL (ref 0–1.2)
BILIRUB UR STRIP.AUTO-MCNC: NEGATIVE MG/DL
BUN SERPL-MCNC: 10 MG/DL (ref 6–23)
CALCIUM SERPL-MCNC: 9.2 MG/DL (ref 8.6–10.3)
CHLORIDE SERPL-SCNC: 105 MMOL/L (ref 98–107)
CHOLEST SERPL-MCNC: 191 MG/DL (ref 0–199)
CHOLESTEROL/HDL RATIO: 3.7
CO2 SERPL-SCNC: 28 MMOL/L (ref 21–32)
COLOR UR: NORMAL
CREAT SERPL-MCNC: 0.87 MG/DL (ref 0.5–1.05)
EGFRCR SERPLBLD CKD-EPI 2021: 87 ML/MIN/1.73M*2
ERYTHROCYTE [DISTWIDTH] IN BLOOD BY AUTOMATED COUNT: 13.4 % (ref 11.5–14.5)
GLUCOSE SERPL-MCNC: 88 MG/DL (ref 74–99)
GLUCOSE UR STRIP.AUTO-MCNC: NORMAL MG/DL
HCT VFR BLD AUTO: 39.8 % (ref 36–46)
HDLC SERPL-MCNC: 51 MG/DL
HGB BLD-MCNC: 12.5 G/DL (ref 12–16)
KETONES UR STRIP.AUTO-MCNC: NEGATIVE MG/DL
LDLC SERPL CALC-MCNC: 124 MG/DL
LEUKOCYTE ESTERASE UR QL STRIP.AUTO: NEGATIVE
MCH RBC QN AUTO: 29.8 PG (ref 26–34)
MCHC RBC AUTO-ENTMCNC: 31.4 G/DL (ref 32–36)
MCV RBC AUTO: 95 FL (ref 80–100)
NITRITE UR QL STRIP.AUTO: NEGATIVE
NON HDL CHOLESTEROL: 140 MG/DL (ref 0–149)
NRBC BLD-RTO: 0 /100 WBCS (ref 0–0)
PH UR STRIP.AUTO: 7.5 [PH]
PLATELET # BLD AUTO: 285 X10*3/UL (ref 150–450)
POTASSIUM SERPL-SCNC: 4.1 MMOL/L (ref 3.5–5.3)
PROT SERPL-MCNC: 6.5 G/DL (ref 6.4–8.2)
PROT UR STRIP.AUTO-MCNC: NEGATIVE MG/DL
RBC # BLD AUTO: 4.19 X10*6/UL (ref 4–5.2)
RBC # UR STRIP.AUTO: NEGATIVE /UL
SODIUM SERPL-SCNC: 140 MMOL/L (ref 136–145)
SP GR UR STRIP.AUTO: 1.02
TRIGL SERPL-MCNC: 79 MG/DL (ref 0–149)
TSH SERPL-ACNC: 1.61 MIU/L (ref 0.44–3.98)
UROBILINOGEN UR STRIP.AUTO-MCNC: NORMAL MG/DL
VLDL: 16 MG/DL (ref 0–40)
WBC # BLD AUTO: 6.8 X10*3/UL (ref 4.4–11.3)

## 2024-07-05 PROCEDURE — 84702 CHORIONIC GONADOTROPIN TEST: CPT

## 2024-07-05 PROCEDURE — 85027 COMPLETE CBC AUTOMATED: CPT

## 2024-07-05 PROCEDURE — 87389 HIV-1 AG W/HIV-1&-2 AB AG IA: CPT

## 2024-07-05 PROCEDURE — 87591 N.GONORRHOEAE DNA AMP PROB: CPT

## 2024-07-05 PROCEDURE — 82248 BILIRUBIN DIRECT: CPT

## 2024-07-05 PROCEDURE — 84443 ASSAY THYROID STIM HORMONE: CPT

## 2024-07-05 PROCEDURE — 86803 HEPATITIS C AB TEST: CPT

## 2024-07-05 PROCEDURE — 80053 COMPREHEN METABOLIC PANEL: CPT

## 2024-07-05 PROCEDURE — 86780 TREPONEMA PALLIDUM: CPT

## 2024-07-05 PROCEDURE — 80061 LIPID PANEL: CPT

## 2024-07-05 PROCEDURE — 81003 URINALYSIS AUTO W/O SCOPE: CPT

## 2024-07-05 PROCEDURE — 82306 VITAMIN D 25 HYDROXY: CPT

## 2024-07-05 PROCEDURE — 36415 COLL VENOUS BLD VENIPUNCTURE: CPT

## 2024-07-05 PROCEDURE — 86706 HEP B SURFACE ANTIBODY: CPT

## 2024-07-05 PROCEDURE — 87491 CHLMYD TRACH DNA AMP PROBE: CPT

## 2024-07-05 PROCEDURE — 87340 HEPATITIS B SURFACE AG IA: CPT

## 2024-07-06 LAB
C TRACH RRNA SPEC QL NAA+PROBE: NEGATIVE
HBV SURFACE AB SER-ACNC: 13.9 MIU/ML
HBV SURFACE AG SERPL QL IA: NONREACTIVE
HCV AB SER QL: NONREACTIVE
HIV 1+2 AB+HIV1 P24 AG SERPL QL IA: NONREACTIVE
N GONORRHOEA DNA SPEC QL PROBE+SIG AMP: NEGATIVE
TREPONEMA PALLIDUM IGG+IGM AB [PRESENCE] IN SERUM OR PLASMA BY IMMUNOASSAY: NONREACTIVE

## 2024-07-09 ENCOUNTER — PATIENT MESSAGE (OUTPATIENT)
Dept: PRIMARY CARE | Facility: CLINIC | Age: 39
End: 2024-07-09
Payer: COMMERCIAL

## 2024-07-09 DIAGNOSIS — N76.0 BACTERIAL VAGINOSIS: Primary | ICD-10-CM

## 2024-07-09 DIAGNOSIS — B96.89 BACTERIAL VAGINOSIS: Primary | ICD-10-CM

## 2024-07-23 PROCEDURE — RXMED WILLOW AMBULATORY MEDICATION CHARGE

## 2024-07-23 RX ORDER — METRONIDAZOLE 500 MG/1
500 TABLET ORAL 2 TIMES DAILY
Qty: 14 TABLET | Refills: 0 | Status: SHIPPED | OUTPATIENT
Start: 2024-07-23 | End: 2024-07-30

## 2024-07-29 ENCOUNTER — PHARMACY VISIT (OUTPATIENT)
Dept: PHARMACY | Facility: CLINIC | Age: 39
End: 2024-07-29
Payer: MEDICAID

## 2024-07-29 PROCEDURE — RXMED WILLOW AMBULATORY MEDICATION CHARGE

## 2024-07-30 ENCOUNTER — APPOINTMENT (OUTPATIENT)
Dept: PRIMARY CARE | Facility: CLINIC | Age: 39
End: 2024-07-30
Payer: COMMERCIAL

## 2024-07-31 PROCEDURE — RXMED WILLOW AMBULATORY MEDICATION CHARGE

## 2024-08-05 PROCEDURE — RXMED WILLOW AMBULATORY MEDICATION CHARGE

## 2024-08-06 ENCOUNTER — PHARMACY VISIT (OUTPATIENT)
Dept: PHARMACY | Facility: CLINIC | Age: 39
End: 2024-08-06
Payer: MEDICAID

## 2024-08-10 ENCOUNTER — PATIENT MESSAGE (OUTPATIENT)
Dept: PRIMARY CARE | Facility: CLINIC | Age: 39
End: 2024-08-10
Payer: COMMERCIAL

## 2024-08-10 DIAGNOSIS — B37.9 YEAST INFECTION: Primary | ICD-10-CM

## 2024-08-13 PROCEDURE — RXMED WILLOW AMBULATORY MEDICATION CHARGE

## 2024-08-13 RX ORDER — FLUCONAZOLE 150 MG/1
150 TABLET ORAL ONCE
Qty: 1 TABLET | Refills: 0 | Status: SHIPPED | OUTPATIENT
Start: 2024-08-13 | End: 2024-08-14

## 2024-08-21 ENCOUNTER — PHARMACY VISIT (OUTPATIENT)
Dept: PHARMACY | Facility: CLINIC | Age: 39
End: 2024-08-21
Payer: MEDICAID

## 2024-08-28 ENCOUNTER — PHARMACY VISIT (OUTPATIENT)
Dept: PHARMACY | Facility: CLINIC | Age: 39
End: 2024-08-28
Payer: MEDICAID

## 2024-08-28 PROCEDURE — RXMED WILLOW AMBULATORY MEDICATION CHARGE

## 2024-09-11 ENCOUNTER — PATIENT MESSAGE (OUTPATIENT)
Dept: PRIMARY CARE | Facility: CLINIC | Age: 39
End: 2024-09-11
Payer: COMMERCIAL

## 2024-09-11 DIAGNOSIS — B37.9 YEAST INFECTION: Primary | ICD-10-CM

## 2024-09-11 PROCEDURE — RXMED WILLOW AMBULATORY MEDICATION CHARGE

## 2024-09-12 DIAGNOSIS — G43.009 MIGRAINE WITHOUT AURA AND WITHOUT STATUS MIGRAINOSUS, NOT INTRACTABLE: ICD-10-CM

## 2024-09-12 PROCEDURE — RXMED WILLOW AMBULATORY MEDICATION CHARGE

## 2024-09-12 RX ORDER — FLUCONAZOLE 150 MG/1
150 TABLET ORAL ONCE
Qty: 2 TABLET | Refills: 0 | Status: SHIPPED | OUTPATIENT
Start: 2024-09-12 | End: 2024-09-14

## 2024-09-17 ENCOUNTER — PHARMACY VISIT (OUTPATIENT)
Dept: PHARMACY | Facility: CLINIC | Age: 39
End: 2024-09-17
Payer: MEDICAID

## 2024-09-28 ENCOUNTER — PHARMACY VISIT (OUTPATIENT)
Dept: PHARMACY | Facility: CLINIC | Age: 39
End: 2024-09-28
Payer: MEDICAID

## 2024-09-28 PROCEDURE — RXMED WILLOW AMBULATORY MEDICATION CHARGE

## 2024-10-06 ENCOUNTER — PHARMACY VISIT (OUTPATIENT)
Dept: PHARMACY | Facility: CLINIC | Age: 39
End: 2024-10-06
Payer: MEDICAID

## 2024-10-06 PROCEDURE — RXMED WILLOW AMBULATORY MEDICATION CHARGE

## 2024-10-08 ENCOUNTER — PHARMACY VISIT (OUTPATIENT)
Dept: PHARMACY | Facility: CLINIC | Age: 39
End: 2024-10-08
Payer: MEDICAID

## 2024-10-08 ENCOUNTER — APPOINTMENT (OUTPATIENT)
Dept: PRIMARY CARE | Facility: CLINIC | Age: 39
End: 2024-10-08
Payer: COMMERCIAL

## 2024-10-08 VITALS
DIASTOLIC BLOOD PRESSURE: 79 MMHG | HEART RATE: 77 BPM | HEIGHT: 68 IN | WEIGHT: 194 LBS | BODY MASS INDEX: 29.4 KG/M2 | SYSTOLIC BLOOD PRESSURE: 117 MMHG

## 2024-10-08 DIAGNOSIS — L05.91 CYST NEAR COCCYX: Primary | ICD-10-CM

## 2024-10-08 DIAGNOSIS — G43.009 MIGRAINE WITHOUT AURA AND WITHOUT STATUS MIGRAINOSUS, NOT INTRACTABLE: ICD-10-CM

## 2024-10-08 PROCEDURE — 99214 OFFICE O/P EST MOD 30 MIN: CPT

## 2024-10-08 PROCEDURE — 1036F TOBACCO NON-USER: CPT

## 2024-10-08 PROCEDURE — RXMED WILLOW AMBULATORY MEDICATION CHARGE

## 2024-10-08 PROCEDURE — 3008F BODY MASS INDEX DOCD: CPT

## 2024-10-08 RX ORDER — MUPIROCIN CALCIUM 20 MG/G
CREAM TOPICAL 3 TIMES DAILY
Qty: 15 G | Refills: 0 | Status: SHIPPED | OUTPATIENT
Start: 2024-10-08 | End: 2024-10-18

## 2024-10-08 RX ORDER — BUTALBITAL, ACETAMINOPHEN AND CAFFEINE 50; 325; 40 MG/1; MG/1; MG/1
1 TABLET ORAL EVERY 4 HOURS PRN
Qty: 30 TABLET | Refills: 1 | Status: SHIPPED | OUTPATIENT
Start: 2024-10-08

## 2024-10-08 ASSESSMENT — ENCOUNTER SYMPTOMS
VOMITING: 0
WOUND: 1
FEVER: 0
NAUSEA: 0
CHILLS: 0
FATIGUE: 0

## 2024-10-08 NOTE — PROGRESS NOTES
"Subjective   Patient ID: Nuzhat Saenz is a 39 y.o. female who presents for Cyst.    HPI   Here today for a cyst on her bottom, reports she has had it for a few months and it is improving. She has had them before and usually drains on it's own. Reports it is in the healing process, it is less swollen, denies fevers and chills. Will treat topically and if needed will treat PO antibiotics.   Review of Systems   Constitutional:  Negative for chills, fatigue and fever.   Gastrointestinal:  Negative for nausea and vomiting.   Skin:  Positive for wound.       Objective   /79 (Patient Position: Sitting)   Pulse 77   Ht 1.727 m (5' 8\")   Wt 88 kg (194 lb)   BMI 29.50 kg/m²     Physical Exam  Skin:            Comments: -Cyst    Neurological:      Mental Status: She is alert and oriented to person, place, and time.         Assessment/Plan        Cyst on buttocks  -Bactroban TID x 10 days   -If no improvement will treat with PO     Migraines  -Refill nurtec and fioricet   "

## 2024-10-26 ENCOUNTER — PHARMACY VISIT (OUTPATIENT)
Dept: PHARMACY | Facility: CLINIC | Age: 39
End: 2024-10-26
Payer: MEDICAID

## 2024-10-26 DIAGNOSIS — G43.009 MIGRAINE WITHOUT AURA AND WITHOUT STATUS MIGRAINOSUS, NOT INTRACTABLE: ICD-10-CM

## 2024-10-26 PROCEDURE — RXMED WILLOW AMBULATORY MEDICATION CHARGE

## 2024-10-29 ENCOUNTER — HOSPITAL ENCOUNTER (OUTPATIENT)
Dept: CARDIOLOGY | Facility: HOSPITAL | Age: 39
Discharge: HOME | End: 2024-10-29
Payer: COMMERCIAL

## 2024-10-29 ENCOUNTER — TELEPHONE (OUTPATIENT)
Dept: PRIMARY CARE | Facility: CLINIC | Age: 39
End: 2024-10-29
Payer: COMMERCIAL

## 2024-10-29 ENCOUNTER — HOSPITAL ENCOUNTER (EMERGENCY)
Facility: HOSPITAL | Age: 39
Discharge: HOME | End: 2024-10-29
Attending: EMERGENCY MEDICINE
Payer: COMMERCIAL

## 2024-10-29 ENCOUNTER — APPOINTMENT (OUTPATIENT)
Dept: RADIOLOGY | Facility: HOSPITAL | Age: 39
End: 2024-10-29
Payer: COMMERCIAL

## 2024-10-29 ENCOUNTER — APPOINTMENT (OUTPATIENT)
Dept: VASCULAR MEDICINE | Facility: HOSPITAL | Age: 39
End: 2024-10-29
Payer: COMMERCIAL

## 2024-10-29 VITALS
HEART RATE: 61 BPM | RESPIRATION RATE: 17 BRPM | DIASTOLIC BLOOD PRESSURE: 83 MMHG | TEMPERATURE: 98.6 F | HEIGHT: 68 IN | SYSTOLIC BLOOD PRESSURE: 111 MMHG | OXYGEN SATURATION: 97 % | WEIGHT: 170 LBS | BODY MASS INDEX: 25.76 KG/M2

## 2024-10-29 DIAGNOSIS — M79.602 LEFT ARM PAIN: ICD-10-CM

## 2024-10-29 DIAGNOSIS — M79.602 PAIN IN LEFT ARM: ICD-10-CM

## 2024-10-29 DIAGNOSIS — R07.9 CHEST PAIN, UNSPECIFIED TYPE: Primary | ICD-10-CM

## 2024-10-29 LAB
ALBUMIN SERPL BCP-MCNC: 4.2 G/DL (ref 3.4–5)
ALP SERPL-CCNC: 80 U/L (ref 33–110)
ALT SERPL W P-5'-P-CCNC: 9 U/L (ref 7–45)
ANION GAP SERPL CALC-SCNC: 10 MMOL/L (ref 10–20)
AST SERPL W P-5'-P-CCNC: 15 U/L (ref 9–39)
BASOPHILS # BLD AUTO: 0.07 X10*3/UL (ref 0–0.1)
BASOPHILS NFR BLD AUTO: 1.1 %
BILIRUB SERPL-MCNC: 0.4 MG/DL (ref 0–1.2)
BUN SERPL-MCNC: 21 MG/DL (ref 6–23)
CALCIUM SERPL-MCNC: 9.4 MG/DL (ref 8.6–10.3)
CARDIAC TROPONIN I PNL SERPL HS: <3 NG/L (ref 0–13)
CARDIAC TROPONIN I PNL SERPL HS: <3 NG/L (ref 0–13)
CHLORIDE SERPL-SCNC: 108 MMOL/L (ref 98–107)
CO2 SERPL-SCNC: 25 MMOL/L (ref 21–32)
CREAT SERPL-MCNC: 0.82 MG/DL (ref 0.5–1.05)
D DIMER PPP FEU-MCNC: 369 NG/ML FEU
EGFRCR SERPLBLD CKD-EPI 2021: >90 ML/MIN/1.73M*2
EOSINOPHIL # BLD AUTO: 0.14 X10*3/UL (ref 0–0.7)
EOSINOPHIL NFR BLD AUTO: 2.1 %
ERYTHROCYTE [DISTWIDTH] IN BLOOD BY AUTOMATED COUNT: 12.6 % (ref 11.5–14.5)
FLUAV RNA RESP QL NAA+PROBE: NOT DETECTED
FLUBV RNA RESP QL NAA+PROBE: NOT DETECTED
GLUCOSE SERPL-MCNC: 100 MG/DL (ref 74–99)
HCT VFR BLD AUTO: 36.9 % (ref 36–46)
HGB BLD-MCNC: 12.1 G/DL (ref 12–16)
HOLD SPECIMEN: NORMAL
IMM GRANULOCYTES # BLD AUTO: 0.02 X10*3/UL (ref 0–0.7)
IMM GRANULOCYTES NFR BLD AUTO: 0.3 % (ref 0–0.9)
LIPASE SERPL-CCNC: 35 U/L (ref 9–82)
LYMPHOCYTES # BLD AUTO: 1.75 X10*3/UL (ref 1.2–4.8)
LYMPHOCYTES NFR BLD AUTO: 26.3 %
MCH RBC QN AUTO: 30.9 PG (ref 26–34)
MCHC RBC AUTO-ENTMCNC: 32.8 G/DL (ref 32–36)
MCV RBC AUTO: 94 FL (ref 80–100)
MONOCYTES # BLD AUTO: 0.47 X10*3/UL (ref 0.1–1)
MONOCYTES NFR BLD AUTO: 7.1 %
NEUTROPHILS # BLD AUTO: 4.2 X10*3/UL (ref 1.2–7.7)
NEUTROPHILS NFR BLD AUTO: 63.1 %
NRBC BLD-RTO: 0 /100 WBCS (ref 0–0)
PLATELET # BLD AUTO: 354 X10*3/UL (ref 150–450)
POTASSIUM SERPL-SCNC: 4.1 MMOL/L (ref 3.5–5.3)
PROT SERPL-MCNC: 6.8 G/DL (ref 6.4–8.2)
RBC # BLD AUTO: 3.92 X10*6/UL (ref 4–5.2)
SARS-COV-2 RNA RESP QL NAA+PROBE: NOT DETECTED
SODIUM SERPL-SCNC: 139 MMOL/L (ref 136–145)
WBC # BLD AUTO: 6.7 X10*3/UL (ref 4.4–11.3)

## 2024-10-29 PROCEDURE — 84484 ASSAY OF TROPONIN QUANT: CPT | Performed by: EMERGENCY MEDICINE

## 2024-10-29 PROCEDURE — 85025 COMPLETE CBC W/AUTO DIFF WBC: CPT | Performed by: EMERGENCY MEDICINE

## 2024-10-29 PROCEDURE — 93005 ELECTROCARDIOGRAM TRACING: CPT

## 2024-10-29 PROCEDURE — 71045 X-RAY EXAM CHEST 1 VIEW: CPT

## 2024-10-29 PROCEDURE — 87636 SARSCOV2 & INF A&B AMP PRB: CPT | Performed by: PHYSICIAN ASSISTANT

## 2024-10-29 PROCEDURE — 96374 THER/PROPH/DIAG INJ IV PUSH: CPT

## 2024-10-29 PROCEDURE — 36415 COLL VENOUS BLD VENIPUNCTURE: CPT | Performed by: EMERGENCY MEDICINE

## 2024-10-29 PROCEDURE — 85379 FIBRIN DEGRADATION QUANT: CPT | Performed by: EMERGENCY MEDICINE

## 2024-10-29 PROCEDURE — 80053 COMPREHEN METABOLIC PANEL: CPT | Performed by: EMERGENCY MEDICINE

## 2024-10-29 PROCEDURE — 71045 X-RAY EXAM CHEST 1 VIEW: CPT | Mod: FOREIGN READ | Performed by: RADIOLOGY

## 2024-10-29 PROCEDURE — 99284 EMERGENCY DEPT VISIT MOD MDM: CPT | Mod: 25

## 2024-10-29 PROCEDURE — 2500000004 HC RX 250 GENERAL PHARMACY W/ HCPCS (ALT 636 FOR OP/ED): Performed by: PHYSICIAN ASSISTANT

## 2024-10-29 PROCEDURE — 96375 TX/PRO/DX INJ NEW DRUG ADDON: CPT

## 2024-10-29 PROCEDURE — 99285 EMERGENCY DEPT VISIT HI MDM: CPT | Mod: 25

## 2024-10-29 PROCEDURE — RXMED WILLOW AMBULATORY MEDICATION CHARGE

## 2024-10-29 PROCEDURE — 93971 EXTREMITY STUDY: CPT

## 2024-10-29 PROCEDURE — 83690 ASSAY OF LIPASE: CPT | Performed by: EMERGENCY MEDICINE

## 2024-10-29 PROCEDURE — 93971 EXTREMITY STUDY: CPT | Performed by: SURGERY

## 2024-10-29 RX ORDER — DEXAMETHASONE SODIUM PHOSPHATE 10 MG/ML
10 INJECTION INTRAMUSCULAR; INTRAVENOUS ONCE
Status: COMPLETED | OUTPATIENT
Start: 2024-10-29 | End: 2024-10-29

## 2024-10-29 RX ORDER — IBUPROFEN 600 MG/1
600 TABLET ORAL EVERY 6 HOURS PRN
Qty: 30 TABLET | Refills: 0 | Status: SHIPPED | OUTPATIENT
Start: 2024-10-29

## 2024-10-29 RX ORDER — ONDANSETRON HYDROCHLORIDE 2 MG/ML
4 INJECTION, SOLUTION INTRAVENOUS ONCE
Status: COMPLETED | OUTPATIENT
Start: 2024-10-29 | End: 2024-10-29

## 2024-10-29 ASSESSMENT — HEART SCORE
ECG: NORMAL
AGE: <45
HISTORY: SLIGHTLY SUSPICIOUS
HEART SCORE: 0
RISK FACTORS: NO KNOWN RISK FACTORS
TROPONIN: LESS THAN OR EQUAL TO NORMAL LIMIT

## 2024-10-29 ASSESSMENT — PAIN DESCRIPTION - ORIENTATION
ORIENTATION: LEFT
ORIENTATION: LEFT

## 2024-10-29 ASSESSMENT — PAIN DESCRIPTION - DESCRIPTORS: DESCRIPTORS: SHARP

## 2024-10-29 ASSESSMENT — COLUMBIA-SUICIDE SEVERITY RATING SCALE - C-SSRS
6. HAVE YOU EVER DONE ANYTHING, STARTED TO DO ANYTHING, OR PREPARED TO DO ANYTHING TO END YOUR LIFE?: NO
1. IN THE PAST MONTH, HAVE YOU WISHED YOU WERE DEAD OR WISHED YOU COULD GO TO SLEEP AND NOT WAKE UP?: NO
2. HAVE YOU ACTUALLY HAD ANY THOUGHTS OF KILLING YOURSELF?: NO

## 2024-10-29 ASSESSMENT — PAIN DESCRIPTION - LOCATION: LOCATION: CHEST

## 2024-10-29 ASSESSMENT — PAIN - FUNCTIONAL ASSESSMENT: PAIN_FUNCTIONAL_ASSESSMENT: 0-10

## 2024-10-29 ASSESSMENT — PAIN SCALES - GENERAL
PAINLEVEL_OUTOF10: 8
PAINLEVEL_OUTOF10: 7
PAINLEVEL_OUTOF10: 7

## 2024-11-01 LAB
ATRIAL RATE: 71 BPM
ATRIAL RATE: 73 BPM
P AXIS: 18 DEGREES
P AXIS: 56 DEGREES
P OFFSET: 196 MS
P OFFSET: 215 MS
P ONSET: 140 MS
P ONSET: 170 MS
PR INTERVAL: 100 MS
PR INTERVAL: 138 MS
Q ONSET: 209 MS
Q ONSET: 220 MS
QRS COUNT: 11 BEATS
QRS COUNT: 12 BEATS
QRS DURATION: 90 MS
QRS DURATION: 92 MS
QT INTERVAL: 362 MS
QT INTERVAL: 386 MS
QTC CALCULATION(BAZETT): 398 MS
QTC CALCULATION(BAZETT): 419 MS
QTC FREDERICIA: 386 MS
QTC FREDERICIA: 408 MS
R AXIS: 33 DEGREES
R AXIS: 76 DEGREES
T AXIS: 24 DEGREES
T AXIS: 43 DEGREES
T OFFSET: 401 MS
T OFFSET: 402 MS
VENTRICULAR RATE: 71 BPM
VENTRICULAR RATE: 73 BPM

## 2024-11-02 PROCEDURE — RXMED WILLOW AMBULATORY MEDICATION CHARGE

## 2024-11-06 ENCOUNTER — PHARMACY VISIT (OUTPATIENT)
Dept: PHARMACY | Facility: CLINIC | Age: 39
End: 2024-11-06
Payer: MEDICAID

## 2024-11-15 ENCOUNTER — PHARMACY VISIT (OUTPATIENT)
Dept: PHARMACY | Facility: CLINIC | Age: 39
End: 2024-11-15
Payer: MEDICAID

## 2024-11-15 PROCEDURE — RXMED WILLOW AMBULATORY MEDICATION CHARGE

## 2024-11-17 ENCOUNTER — HOSPITAL ENCOUNTER (EMERGENCY)
Facility: HOSPITAL | Age: 39
End: 2024-11-17
Payer: COMMERCIAL

## 2024-11-18 ENCOUNTER — HOSPITAL ENCOUNTER (OUTPATIENT)
Dept: CARDIOLOGY | Facility: HOSPITAL | Age: 39
Discharge: HOME | End: 2024-11-18
Payer: COMMERCIAL

## 2024-11-18 ENCOUNTER — PHARMACY VISIT (OUTPATIENT)
Dept: PHARMACY | Facility: CLINIC | Age: 39
End: 2024-11-18
Payer: MEDICAID

## 2024-11-18 ENCOUNTER — APPOINTMENT (OUTPATIENT)
Dept: CARDIOLOGY | Facility: HOSPITAL | Age: 39
End: 2024-11-18
Payer: COMMERCIAL

## 2024-11-18 ENCOUNTER — HOSPITAL ENCOUNTER (EMERGENCY)
Facility: HOSPITAL | Age: 39
Discharge: HOME | End: 2024-11-18
Attending: EMERGENCY MEDICINE
Payer: COMMERCIAL

## 2024-11-18 ENCOUNTER — APPOINTMENT (OUTPATIENT)
Dept: RADIOLOGY | Facility: HOSPITAL | Age: 39
End: 2024-11-18
Payer: COMMERCIAL

## 2024-11-18 VITALS
WEIGHT: 185 LBS | SYSTOLIC BLOOD PRESSURE: 109 MMHG | HEART RATE: 64 BPM | BODY MASS INDEX: 28.04 KG/M2 | DIASTOLIC BLOOD PRESSURE: 68 MMHG | RESPIRATION RATE: 14 BRPM | TEMPERATURE: 97 F | OXYGEN SATURATION: 97 % | HEIGHT: 68 IN

## 2024-11-18 DIAGNOSIS — N64.4 BREAST PAIN, LEFT: ICD-10-CM

## 2024-11-18 DIAGNOSIS — R07.9 CHEST PAIN, UNSPECIFIED TYPE: Primary | ICD-10-CM

## 2024-11-18 LAB
ALBUMIN SERPL BCP-MCNC: 3.8 G/DL (ref 3.4–5)
ALP SERPL-CCNC: 68 U/L (ref 33–110)
ALT SERPL W P-5'-P-CCNC: 6 U/L (ref 7–45)
ANION GAP SERPL CALC-SCNC: 10 MMOL/L (ref 10–20)
AST SERPL W P-5'-P-CCNC: 12 U/L (ref 9–39)
ATRIAL RATE: 54 BPM
ATRIAL RATE: 59 BPM
B-HCG SERPL-ACNC: <2 MIU/ML
BASOPHILS # BLD AUTO: 0.08 X10*3/UL (ref 0–0.1)
BASOPHILS NFR BLD AUTO: 1.4 %
BILIRUB SERPL-MCNC: 0.2 MG/DL (ref 0–1.2)
BUN SERPL-MCNC: 21 MG/DL (ref 6–23)
CALCIUM SERPL-MCNC: 8.8 MG/DL (ref 8.6–10.3)
CARDIAC TROPONIN I PNL SERPL HS: <3 NG/L (ref 0–13)
CARDIAC TROPONIN I PNL SERPL HS: <3 NG/L (ref 0–13)
CHLORIDE SERPL-SCNC: 111 MMOL/L (ref 98–107)
CO2 SERPL-SCNC: 22 MMOL/L (ref 21–32)
CREAT SERPL-MCNC: 0.92 MG/DL (ref 0.5–1.05)
D DIMER PPP FEU-MCNC: 461 NG/ML FEU
EGFRCR SERPLBLD CKD-EPI 2021: 81 ML/MIN/1.73M*2
EOSINOPHIL # BLD AUTO: 0.31 X10*3/UL (ref 0–0.7)
EOSINOPHIL NFR BLD AUTO: 5.6 %
ERYTHROCYTE [DISTWIDTH] IN BLOOD BY AUTOMATED COUNT: 12.9 % (ref 11.5–14.5)
GLUCOSE SERPL-MCNC: 90 MG/DL (ref 74–99)
HCT VFR BLD AUTO: 37.3 % (ref 36–46)
HGB BLD-MCNC: 11.8 G/DL (ref 12–16)
IMM GRANULOCYTES # BLD AUTO: 0.01 X10*3/UL (ref 0–0.7)
IMM GRANULOCYTES NFR BLD AUTO: 0.2 % (ref 0–0.9)
LYMPHOCYTES # BLD AUTO: 1.71 X10*3/UL (ref 1.2–4.8)
LYMPHOCYTES NFR BLD AUTO: 30.8 %
MCH RBC QN AUTO: 30.6 PG (ref 26–34)
MCHC RBC AUTO-ENTMCNC: 31.6 G/DL (ref 32–36)
MCV RBC AUTO: 97 FL (ref 80–100)
MONOCYTES # BLD AUTO: 0.43 X10*3/UL (ref 0.1–1)
MONOCYTES NFR BLD AUTO: 7.7 %
NEUTROPHILS # BLD AUTO: 3.01 X10*3/UL (ref 1.2–7.7)
NEUTROPHILS NFR BLD AUTO: 54.3 %
NRBC BLD-RTO: 0 /100 WBCS (ref 0–0)
P AXIS: 28 DEGREES
P AXIS: 50 DEGREES
P OFFSET: 212 MS
P OFFSET: 215 MS
P ONSET: 160 MS
P ONSET: 167 MS
PLATELET # BLD AUTO: 320 X10*3/UL (ref 150–450)
POTASSIUM SERPL-SCNC: 4.2 MMOL/L (ref 3.5–5.3)
PR INTERVAL: 114 MS
PR INTERVAL: 132 MS
PROT SERPL-MCNC: 6.5 G/DL (ref 6.4–8.2)
Q ONSET: 224 MS
Q ONSET: 226 MS
QRS COUNT: 9 BEATS
QRS COUNT: 9 BEATS
QRS DURATION: 86 MS
QRS DURATION: 88 MS
QT INTERVAL: 382 MS
QT INTERVAL: 390 MS
QTC CALCULATION(BAZETT): 369 MS
QTC CALCULATION(BAZETT): 378 MS
QTC FREDERICIA: 376 MS
QTC FREDERICIA: 380 MS
R AXIS: 21 DEGREES
R AXIS: 61 DEGREES
RBC # BLD AUTO: 3.85 X10*6/UL (ref 4–5.2)
SODIUM SERPL-SCNC: 139 MMOL/L (ref 136–145)
T AXIS: 36 DEGREES
T AXIS: 56 DEGREES
T OFFSET: 417 MS
T OFFSET: 419 MS
VENTRICULAR RATE: 54 BPM
VENTRICULAR RATE: 59 BPM
WBC # BLD AUTO: 5.6 X10*3/UL (ref 4.4–11.3)

## 2024-11-18 PROCEDURE — RXMED WILLOW AMBULATORY MEDICATION CHARGE

## 2024-11-18 PROCEDURE — 85379 FIBRIN DEGRADATION QUANT: CPT | Performed by: EMERGENCY MEDICINE

## 2024-11-18 PROCEDURE — 80053 COMPREHEN METABOLIC PANEL: CPT | Performed by: EMERGENCY MEDICINE

## 2024-11-18 PROCEDURE — 84484 ASSAY OF TROPONIN QUANT: CPT | Performed by: EMERGENCY MEDICINE

## 2024-11-18 PROCEDURE — 93005 ELECTROCARDIOGRAM TRACING: CPT

## 2024-11-18 PROCEDURE — 99283 EMERGENCY DEPT VISIT LOW MDM: CPT

## 2024-11-18 PROCEDURE — 2500000004 HC RX 250 GENERAL PHARMACY W/ HCPCS (ALT 636 FOR OP/ED): Performed by: EMERGENCY MEDICINE

## 2024-11-18 PROCEDURE — 36415 COLL VENOUS BLD VENIPUNCTURE: CPT | Performed by: EMERGENCY MEDICINE

## 2024-11-18 PROCEDURE — 84702 CHORIONIC GONADOTROPIN TEST: CPT | Performed by: EMERGENCY MEDICINE

## 2024-11-18 PROCEDURE — 85025 COMPLETE CBC W/AUTO DIFF WBC: CPT | Performed by: EMERGENCY MEDICINE

## 2024-11-18 PROCEDURE — 71045 X-RAY EXAM CHEST 1 VIEW: CPT

## 2024-11-18 PROCEDURE — 71045 X-RAY EXAM CHEST 1 VIEW: CPT | Performed by: STUDENT IN AN ORGANIZED HEALTH CARE EDUCATION/TRAINING PROGRAM

## 2024-11-18 PROCEDURE — 96372 THER/PROPH/DIAG INJ SC/IM: CPT | Performed by: EMERGENCY MEDICINE

## 2024-11-18 RX ORDER — ETODOLAC 400 MG/1
400 TABLET, FILM COATED ORAL 2 TIMES DAILY
Qty: 14 TABLET | Refills: 0 | Status: SHIPPED | OUTPATIENT
Start: 2024-11-18 | End: 2024-11-25

## 2024-11-18 RX ORDER — KETOROLAC TROMETHAMINE 30 MG/ML
15 INJECTION, SOLUTION INTRAMUSCULAR; INTRAVENOUS ONCE
Status: DISCONTINUED | OUTPATIENT
Start: 2024-11-18 | End: 2024-11-18

## 2024-11-18 RX ORDER — KETOROLAC TROMETHAMINE 30 MG/ML
15 INJECTION, SOLUTION INTRAMUSCULAR; INTRAVENOUS ONCE
Status: COMPLETED | OUTPATIENT
Start: 2024-11-18 | End: 2024-11-18

## 2024-11-18 ASSESSMENT — PAIN SCALES - GENERAL
PAINLEVEL_OUTOF10: 7
PAINLEVEL_OUTOF10: 8

## 2024-11-18 ASSESSMENT — PAIN DESCRIPTION - ORIENTATION: ORIENTATION: LEFT

## 2024-11-18 ASSESSMENT — PAIN DESCRIPTION - PAIN TYPE: TYPE: ACUTE PAIN

## 2024-11-18 ASSESSMENT — PAIN DESCRIPTION - LOCATION: LOCATION: CHEST

## 2024-11-18 ASSESSMENT — PAIN - FUNCTIONAL ASSESSMENT: PAIN_FUNCTIONAL_ASSESSMENT: 0-10

## 2024-11-18 NOTE — PROGRESS NOTES
Emergency Medicine Transition of Care Note.    I received Nuzhat Saenz in signout from Dr. Castro.  Please see the previous ED provider note for all HPI, PE and MDM up to the time of signout at 0700. This is in addition to the primary record.    In brief Nuzhat Saenz is an 39 y.o. female presenting for   Chief Complaint   Patient presents with    Chest Pain     Left Side stabbing CP that radiates to left arm accompanied with SOB.  Patient reports having similar event 2wks ago and being told cardiac wkup was neg. EKG at bedside during triage     At the time of signout we were awaiting: labs /repeat EKG    ED Course as of 11/18/24 0827   Mon Nov 18, 2024   0644 Twelve-lead EKG interpreted by myself at 0635 1 sinus bradycardia 59 2 low voltage criteria 3 no ectopy 4 normal axis [MS]      ED Course User Index  [MS] Rishabh Castro, DO         Diagnoses as of 11/18/24 0827   Chest pain, unspecified type   Breast pain, left       Medical Decision Making  Patient was endorsed to me from the overnight provider.  Please see their note for prior history and physical exam details.    Repeat EKG was performed at 7:35 AM.  This study was reviewed by myself this was noted to contribute directly to patient care.  Study reveals a sinus bradycardia at 84 bpm, overall low voltage, normal axis, normal R wave progression, no acute ischemic changes.  No S1 Q3 T3 is identified.    Patient's metabolic workup is completed and unremarkable.  I went into the room with the patient's nurse Rebekah to examine the affected area on the left chest wall.  The patient is complaining of pain to the underside of the left breast tissue as well as over the ribs themselves.  There is no palpable mass to the breast.  There is no evidence of mastitis or cellulitis.  No zoster type rashes and no crepitus.  Patient does reports she has a history of fibrocystic breast disease    Differential considerations would include, but not limited to,  fibrocystic breast disease, chronic fibromyalgia pain, rib strain, pleurisy, amongst many others.  Patient has a heart score of 1.  She has a negative workup.  I feel she is appropriate for discharge.  We will discontinue ibuprofen and placed the patient on etodolac.  She will be given referrals for both primary care and GYN so that she can follow-up about her symptoms.  Specifically I recommended that the patient have a mammogram given her medical history and clinical presentation.  Limit activity as tolerated and return at anytime if worse.    Final diagnoses:   [R07.9] Chest pain, unspecified type   [N64.4] Breast pain, left           Procedure  Procedures    Raymond Nick, DO

## 2024-11-18 NOTE — Clinical Note
Nuzhat Letty was seen and treated in our emergency department on 11/18/2024.  She may return to work on 11/19/2024.       If you have any questions or concerns, please don't hesitate to call.      Raymond Nick, DO

## 2024-11-18 NOTE — ED PROVIDER NOTES
HPI   Chief Complaint   Patient presents with    Chest Pain     Left Side stabbing CP that radiates to left arm.  Patient reports having similar event 2wks ago and being told cardiac wkup was neg       39-year-old female presents with some left shoulder and upper chest pain.  Patient states she awoken with it today.  Patient was seen in [facility] yesterday but signed out AGAINST MEDICAL ADVICE.  Patient was seen here 2 weeks ago for similar symptoms and instructed to take Motrin which she states she has been taking with no improvement.  Patient does have a very pungent odor of marijuana which she does admit to using.  Patient is complaining of some pain with deep inspiration and coughing.  Denies any palpitation, nausea or vomiting.      History provided by:  Patient          Patient History   Past Medical History:   Diagnosis Date    Anxiety     Calculus of kidney     Recurrent kidney stones    Depression     Fibromyalgia     Interstitial cystitis     Migraines     OCD (obsessive compulsive disorder)     Other specified anxiety disorders     Depression with anxiety    Other specified diseases of liver     Intrahepatic cholestasis    Personal history of diseases of the blood and blood-forming organs and certain disorders involving the immune mechanism     History of anemia    Personal history of other diseases of the nervous system and sense organs     History of eustachian tube dysfunction    Personal history of other diseases of the respiratory system     History of sinusitis    Personal history of other diseases of urinary system     History of kidney disease    Personal history of other mental and behavioral disorders     History of panic attacks    Personal history of urinary (tract) infections     History of cystitis     Past Surgical History:   Procedure Laterality Date    APPENDECTOMY  2016    Appendectomy    BREAST LUMPECTOMY  2016    Breast Surgery Lumpectomy     SECTION, CLASSIC   2016     Section    GALLBLADDER SURGERY  2016    Gallbladder Surgery    MYRINGOTOMY W/ TUBES      OTHER SURGICAL HISTORY  2016    Ureterotomy For Insertion Of Stent    OTHER SURGICAL HISTORY  2020    Dental surgery    WISDOM TOOTH EXTRACTION       Family History   Problem Relation Name Age of Onset    Dementia Mother      Dementia Father       Social History     Tobacco Use    Smoking status: Former     Types: Cigarettes    Smokeless tobacco: Never   Substance Use Topics    Alcohol use: Not Currently    Drug use: Yes     Types: Marijuana       Physical Exam   ED Triage Vitals [24 0633]   Temperature Heart Rate Respirations BP   36.1 °C (97 °F) 73 18 107/73      Pulse Ox Temp Source Heart Rate Source Patient Position   100 % Temporal Monitor --      BP Location FiO2 (%)     -- --       Physical Exam  Vitals and nursing note reviewed.   Constitutional:       General: She is not in acute distress.     Appearance: She is well-developed.   HENT:      Head: Normocephalic and atraumatic.   Eyes:      Conjunctiva/sclera: Conjunctivae normal.   Cardiovascular:      Rate and Rhythm: Normal rate and regular rhythm.      Heart sounds: No murmur heard.  Pulmonary:      Effort: Pulmonary effort is normal. No respiratory distress.      Breath sounds: Normal breath sounds.   Abdominal:      Palpations: Abdomen is soft.      Tenderness: There is no abdominal tenderness.   Musculoskeletal:         General: No swelling.      Cervical back: Neck supple.      Comments: Palpable tenderness with decreased range of motion in the left shoulder.  Neurovascular is intact.   Skin:     General: Skin is warm and dry.      Capillary Refill: Capillary refill takes less than 2 seconds.   Neurological:      Mental Status: She is alert.   Psychiatric:         Mood and Affect: Mood normal.       XR chest 1 view   Final Result   1.  No evidence of acute cardiopulmonary process.             Signed by: Neeraj Castrejon  11/18/2024 6:57 AM   Dictation workstation:   [id number]         ED Course & MDM   ED Course as of 11/28/24 1926   Mon Nov 18, 2024   0644 Twelve-lead EKG interpreted by myself at 0635 1 sinus bradycardia 59 2 low voltage criteria 3 no ectopy 4 normal axis [MS]      ED Course User Index  [MS] Rishabh Castro DO         Diagnoses as of 11/28/24 1926   Chest pain, unspecified type   Breast pain, left                 No data recorded     Lina Coma Scale Score: 15 (11/18/24 0633 : Michael Swenson RN)                           Medical Decision Making  Discharge.    Procedure  Procedures     Rishabh Castro DO  11/28/24 1927

## 2024-11-26 ENCOUNTER — PHARMACY VISIT (OUTPATIENT)
Dept: PHARMACY | Facility: CLINIC | Age: 39
End: 2024-11-26
Payer: MEDICAID

## 2024-11-26 PROCEDURE — RXMED WILLOW AMBULATORY MEDICATION CHARGE

## 2024-11-27 DIAGNOSIS — F41.0 PANIC ATTACK: ICD-10-CM

## 2024-11-27 DIAGNOSIS — F41.8 MIXED ANXIETY AND DEPRESSIVE DISORDER: ICD-10-CM

## 2024-11-27 DIAGNOSIS — M79.7 FIBROMYALGIA: ICD-10-CM

## 2024-11-27 PROCEDURE — RXMED WILLOW AMBULATORY MEDICATION CHARGE

## 2024-11-27 RX ORDER — GABAPENTIN 800 MG/1
800 TABLET ORAL 3 TIMES DAILY
Qty: 90 TABLET | Refills: 3 | Status: SHIPPED | OUTPATIENT
Start: 2024-11-27

## 2024-11-27 RX ORDER — LORAZEPAM 1 MG/1
1 TABLET ORAL EVERY 8 HOURS PRN
Qty: 30 TABLET | Refills: 0 | Status: SHIPPED | OUTPATIENT
Start: 2024-11-27 | End: 2025-02-25

## 2024-11-28 DIAGNOSIS — G43.009 MIGRAINE WITHOUT AURA AND WITHOUT STATUS MIGRAINOSUS, NOT INTRACTABLE: ICD-10-CM

## 2024-11-29 PROCEDURE — RXMED WILLOW AMBULATORY MEDICATION CHARGE

## 2024-12-02 ENCOUNTER — PHARMACY VISIT (OUTPATIENT)
Dept: PHARMACY | Facility: CLINIC | Age: 39
End: 2024-12-02
Payer: MEDICAID

## 2024-12-02 PROCEDURE — RXMED WILLOW AMBULATORY MEDICATION CHARGE

## 2024-12-03 PROCEDURE — RXMED WILLOW AMBULATORY MEDICATION CHARGE

## 2024-12-06 DIAGNOSIS — N30.10 INTERSTITIAL CYSTITIS: ICD-10-CM

## 2024-12-06 RX ORDER — URINARY ANTISEPTIC ANTISPASMODIC 81.6; 40.8; 10.8; .12 MG/1; MG/1; MG/1; MG/1
1 TABLET ORAL 3 TIMES DAILY PRN
Qty: 90 TABLET | Refills: 3 | Status: SHIPPED | OUTPATIENT
Start: 2024-12-06

## 2024-12-10 ENCOUNTER — PHARMACY VISIT (OUTPATIENT)
Dept: PHARMACY | Facility: CLINIC | Age: 39
End: 2024-12-10
Payer: MEDICAID

## 2024-12-10 ENCOUNTER — OFFICE VISIT (OUTPATIENT)
Dept: URGENT CARE | Facility: CLINIC | Age: 39
End: 2024-12-10
Payer: COMMERCIAL

## 2024-12-10 VITALS
WEIGHT: 180 LBS | HEIGHT: 68 IN | BODY MASS INDEX: 27.28 KG/M2 | SYSTOLIC BLOOD PRESSURE: 109 MMHG | DIASTOLIC BLOOD PRESSURE: 73 MMHG | RESPIRATION RATE: 16 BRPM | TEMPERATURE: 98.4 F | OXYGEN SATURATION: 96 %

## 2024-12-10 DIAGNOSIS — R30.0 DYSURIA: Primary | ICD-10-CM

## 2024-12-10 LAB
POC APPEARANCE, URINE: CLEAR
POC BILIRUBIN, URINE: ABNORMAL
POC BLOOD, URINE: NEGATIVE
POC COLOR, URINE: YELLOW
POC GLUCOSE, URINE: ABNORMAL MG/DL
POC KETONES, URINE: ABNORMAL MG/DL
POC LEUKOCYTES, URINE: NEGATIVE
POC NITRITE,URINE: NEGATIVE
POC PH, URINE: 6 PH
POC PROTEIN, URINE: ABNORMAL MG/DL
POC SPECIFIC GRAVITY, URINE: >=1.03
POC UROBILINOGEN, URINE: 1 EU/DL

## 2024-12-10 PROCEDURE — RXMED WILLOW AMBULATORY MEDICATION CHARGE

## 2024-12-10 PROCEDURE — 81002 URINALYSIS NONAUTO W/O SCOPE: CPT | Performed by: NURSE PRACTITIONER

## 2024-12-10 PROCEDURE — 99213 OFFICE O/P EST LOW 20 MIN: CPT | Performed by: NURSE PRACTITIONER

## 2024-12-10 PROCEDURE — 87086 URINE CULTURE/COLONY COUNT: CPT

## 2024-12-10 RX ORDER — TRIMETHOPRIM 100 MG/1
100 TABLET ORAL 2 TIMES DAILY
Qty: 14 TABLET | Refills: 0 | Status: SHIPPED | OUTPATIENT
Start: 2024-12-10 | End: 2024-12-17

## 2024-12-10 NOTE — PROGRESS NOTES
[facility] URG[facility]   ALHAJI NOTE:      Name: Nuzhat Saenz, 39 y.o.    CSN:[id number]   MRN:[id number]    PCP: Courtney Naylor, APRN-CNP    ALL:    Allergies   Allergen Reactions    Sulfa (Sulfonamide Antibiotics) Hives and Other     Pt says she gets sores on her tongue    Codeine GI Upset    Macrobid [Nitrofurantoin Monohyd/M-Cryst] Other     Tongue irritation.       History:    Chief Complaint: UTI (Dysuria, frequency and urgency , fevers, back pain X 1 week )    Encounter Date: 12/10/2024      HPI: The history was obtained from the patient. Nuzhat is a 39 y.o. female, who presents with a chief complaint of UTI (Dysuria, frequency and urgency , fevers, back pain X 1 week ).  Urinary tract symptoms began 1 week ago and have worsened over the last week.  Does have a history of interstitial cystitis for which she is seeing Dr. Llanes for workup.  He has not seen him in over 2 years symptoms have improved until the last week.    PMHx:    Past Medical History:   Diagnosis Date    Anxiety     Calculus of kidney     Recurrent kidney stones    Depression     Fibromyalgia     Interstitial cystitis     Migraines     OCD (obsessive compulsive disorder)     Other specified anxiety disorders     Depression with anxiety    Other specified diseases of liver     Intrahepatic cholestasis    Personal history of diseases of the blood and blood-forming organs and certain disorders involving the immune mechanism     History of anemia    Personal history of other diseases of the nervous system and sense organs     History of eustachian tube dysfunction    Personal history of other diseases of the respiratory system     History of sinusitis    Personal history of other diseases of urinary system     History of kidney disease    Personal history of other mental and behavioral disorders     History of panic attacks    Personal history of urinary (tract) infections     History of cystitis              Current  Outpatient Medications   Medication Sig Dispense Refill    buPROPion XL (Wellbutrin XL) 300 mg 24 hr tablet TAKE ONE TABLET BY MOUTH EVERY DAY 90 tablet 3    butalbital-acetaminophen-caff -40 mg tablet Take 1 tablet by mouth every 4 hours if needed for headaches. 30 tablet 1    DULoxetine (Cymbalta) 30 mg DR capsule Take 1 capsule (30 mg) by mouth once daily. Do not crush or chew. 30 capsule 11    DULoxetine (Cymbalta) 60 mg DR capsule Take 1 capsule (60 mg) by mouth once daily. Do not crush or chew. 30 capsule 11    gabapentin (Neurontin) 800 mg tablet Take 1 tablet (800 mg) by mouth 3 times a day. 90 tablet 3    LORazepam (Ativan) 1 mg tablet Take 1 tablet (1 mg) by mouth every 8 hours if needed for anxiety (panic attacks). 30 tablet 0    methen-sod phos-meth blue-hyos 81.6-40.8-0.12 mg tablet Take 1 tablet by mouth 3 times a day as needed for(dysuria). 90 tablet 3    mirtazapine (Remeron) 45 mg tablet Take 1 tablet (45 mg) by mouth once daily at bedtime. 30 tablet 11    rimegepant (Nurtec ODT) 75 mg tablet,disintegrating Dissolve 1 tablet (75 mg) in the mouth every other day. 16 tablet 0    tretinoin (Altreno) 0.05 % lotion Apply 1 Application topically once daily. 45 g 3    trimethoprim (Trimpex) 100 mg tablet Take 1 tablet (100 mg) by mouth 2 times a day for 7 days. 14 tablet 0     No current facility-administered medications for this visit.     Facility-Administered Medications Ordered in Other Visits   Medication Dose Route Frequency Provider Last Rate Last Admin    rabies vaccine (from purified chicken embryo cells) (RabAvert) 2.5 unit immunization 1 mL  1 mL intramuscular Once Nelsy Colin, APRN-CNP             PMSx:    Past Surgical History:   Procedure Laterality Date    APPENDECTOMY  2016    Appendectomy    BREAST LUMPECTOMY  2016    Breast Surgery Lumpectomy     SECTION, CLASSIC  2016     Section    GALLBLADDER SURGERY  2016    Gallbladder Surgery     MYRINGOTOMY W/ TUBES      OTHER SURGICAL HISTORY  2016    Ureterotomy For Insertion Of Stent    OTHER SURGICAL HISTORY  2020    Dental surgery    WISDOM TOOTH EXTRACTION         Fam Hx:   Family History   Problem Relation Name Age of Onset    Dementia Mother      Dementia Father         SOC. Hx:     Social History     Socioeconomic History    Marital status:      Spouse name: Not on file    Number of children: Not on file    Years of education: Not on file    Highest education level: Not on file   Occupational History    Not on file   Tobacco Use    Smoking status: Former     Types: Cigarettes    Smokeless tobacco: Never   Substance and Sexual Activity    Alcohol use: Not Currently    Drug use: Yes     Types: Marijuana    Sexual activity: Not on file   Other Topics Concern    Not on file   Social History Narrative    ** Merged History Encounter **          Social Drivers of Health     Financial Resource Strain: Not on File (2019)    Received from [facility]    Financial Resource Strain     Financial Resource Strain: 0   Food Insecurity: Not on File (2019)    Received from [facility]    Food Insecurity     Food: 0   Transportation Needs: Not on File (2019)    Received from [facility]    Transportation Needs   Physical Activity: Not on File (2019)    Received from [facility]    Physical Activity     Physical Activity: 0   Stress: Not on File (2019)    Received from [facility]    Stress   Social Connections: Not on File (2019)    Received from [facility]    Social Connections     Social Connections and Isolation: 0   Intimate Partner Violence: Not on file   Housing Stability: Not on File (2019)    Received from [facility]    Housing Stability     Housin         Vitals:    12/10/24 1148   BP: 109/73   Resp: 16   Temp: 36.9 °C (98.4 °F)   SpO2: 96%     81.6 kg (180 lb)          Physical Exam  Vitals and nursing note reviewed.   Constitutional:       Appearance:  Normal appearance.   HENT:      Head: Normocephalic and atraumatic.      Mouth/Throat:      Mouth: Mucous membranes are moist.      Pharynx: Oropharynx is clear.   Eyes:      Pupils: Pupils are equal, round, and reactive to light.   Cardiovascular:      Rate and Rhythm: Normal rate and regular rhythm.      Pulses: Normal pulses.      Heart sounds: Normal heart sounds.   Pulmonary:      Effort: Pulmonary effort is normal.      Breath sounds: Normal breath sounds.   Abdominal:      General: Abdomen is flat. Bowel sounds are normal.      Palpations: Abdomen is soft.      Tenderness: There is abdominal tenderness in the suprapubic area. There is no right CVA tenderness or left CVA tenderness.   Musculoskeletal:         General: Normal range of motion.      Cervical back: Normal range of motion.   Skin:     General: Skin is warm and dry.      Capillary Refill: Capillary refill takes less than 2 seconds.   Neurological:      Mental Status: She is alert and oriented to person, place, and time.           LABORATORY @ RADIOLOGICAL IMAGING (if done):     Results for orders placed or performed in visit on 12/10/24 (from the past 24 hours)   POCT UA (nonautomated w/o microscopy) manually resulted   Result Value Ref Range    POC Color, Urine Yellow Straw, Yellow, Light-Yellow    POC Appearance, Urine Clear Clear    POC Glucose, Urine 100 (1+) (A) NEGATIVE mg/dl    POC Bilirubin, Urine MODERATE (2+) (A) NEGATIVE    POC Ketones, Urine 15 (1+) (A) NEGATIVE mg/dl    POC Specific Gravity, Urine >=1.030 1.005 - 1.035    POC Blood, Urine NEGATIVE NEGATIVE    POC PH, Urine 6.0 No Reference Range Established PH    POC Protein, Urine 30 (1+) NEGATIVE, 30 (1+) mg/dl    POC Urobilinogen, Urine 1.0 0.2, 1.0 EU/DL    Poc Nitrite, Urine NEGATIVE NEGATIVE    POC Leukocytes, Urine NEGATIVE NEGATIVE     ____________________________________________________________________    I did personally review Nuzhat's past medical history, surgical  history, social history, as well as family history (when relevant).  In this case, I also oversaw the her drug management by reviewing her medication list, allergy list, as well as the medications that I prescribed during the UC course and/or recommended as an out-patient (including possible OTC medications such as acetaminophen, NSAIDs , etc).    After reviewing the items above, I did look at previous medical documentation, such as recent hospitalizations, office visits, and/or recent consultations with PCP/specialist.                          SDOH:   Another factor that I considered in Nuzhat's care was her Social Determinants of Health (SDOH). During this UC encounter, she did not have social determinants of health. Those SDOH influencing Nuzhat's care are: none      _____________________________________________________________________      UC COURSE/MEDICAL DECISION MAKING:    Nuzhat is a 39 y.o., who presents with a working diagnosis of   1. Dysuria        Nuzhat was seen today for uti.  Diagnoses and all orders for this visit:  Dysuria (Primary)  -     POCT UA (nonautomated w/o microscopy) manually resulted  -     Urine culture; Future  -     trimethoprim (Trimpex) 100 mg tablet; Take 1 tablet (100 mg) by mouth 2 times a day for 7 days.     Will notify patient of urinary culture results in the next 2 to 3 days.  Recommend following up with Dr. Mendez if symptoms do not improve.    Plan of care reviewed with patient.  If symptoms change and/or worsen will follow-up with primary care provider, return to urgent care, or go to the nearest emergency department for further evaluation.  Patient states understanding and is agreeable with plan of care.      CALI Harris, DNP   Advanced Practice Provider  [facility] URG[facility]ARE    Please note: While the patient may or may not have received printed discharge paperwork, all relevant medical findings, test results, and treatment details are  accessible through the electronic medical record system. The patient is encouraged to review their chart via the patient portal for comprehensive information and follow-up instructions.

## 2024-12-10 NOTE — LETTER
December 10, 2024     Patient: Nuzhat Saenz   YOB: 1985   Date of Visit: 12/10/2024       To Whom It May Concern:    It is my medical opinion that Nuzhat Saenz may return to work on 12/12/2024 .    If you have any questions or concerns, please don't hesitate to call.         Sincerely,        CALI Interiano-CNP    CC: No Recipients

## 2024-12-11 ENCOUNTER — TELEMEDICINE (OUTPATIENT)
Dept: PRIMARY CARE | Facility: CLINIC | Age: 39
End: 2024-12-11
Payer: COMMERCIAL

## 2024-12-11 DIAGNOSIS — R82.4 KETONURIA: ICD-10-CM

## 2024-12-11 DIAGNOSIS — R73.01 IMPAIRED FASTING GLUCOSE: Primary | ICD-10-CM

## 2024-12-11 LAB — BACTERIA UR CULT: NORMAL

## 2024-12-11 PROCEDURE — 99442 PR PHYS/QHP TELEPHONE EVALUATION 11-20 MIN: CPT

## 2024-12-11 PROCEDURE — 1036F TOBACCO NON-USER: CPT

## 2024-12-11 ASSESSMENT — ENCOUNTER SYMPTOMS
POLYPHAGIA: 0
FLANK PAIN: 0
POLYDIPSIA: 0
DYSURIA: 0

## 2024-12-11 NOTE — PROGRESS NOTES
Subjective   Patient ID: Nuzhat Saenz is a 39 y.o. female who presents for No chief complaint on file..    HPI   Pt here for telephone encounter to discuss  Pt and physician are at two separate locations.   Pt was verbally consented for the encounter  Acute concerns consisting of UA at urgent care, it was negative for UTI, but she is positive for glucose and ketones. We did discuss possibility of prediabetes or diabetes as her fasting glucose as been slightly elevated before as well.     We did discuss her migraines after starting Nurtec, she reports a significant decline in the amount of migraines she is getting in a month, down to 2-3 a month. She is also able to function through these migraines and is not having to use her fioricet as much as an abortive medication.   She denies any other concerns at this time.   Review of Systems   Endocrine: Negative for polydipsia, polyphagia and polyuria.   Genitourinary:  Negative for dysuria and flank pain.       Objective   LMP 12/01/2024     Physical Exam  Neurological:      Mental Status: She is alert and oriented to person, place, and time.         Assessment/Plan   Problem List Items Addressed This Visit    None  Visit Diagnoses         Codes    Impaired fasting glucose    -  Primary R73.01    Relevant Orders    Hemoglobin A1c             Ketones and glucose in urine  -Hgb A1C ordered  -Encouraged lifestyle modifications of reducing carbs and sugar in her diet.   -Will follow A1C results

## 2024-12-12 ENCOUNTER — TELEPHONE (OUTPATIENT)
Dept: URGENT CARE | Facility: CLINIC | Age: 39
End: 2024-12-12
Payer: COMMERCIAL

## 2024-12-13 NOTE — TELEPHONE ENCOUNTER
Reviewed urine culture result with patient. States her symptoms have persisted without much change - believes sxs may be related to her interstitial cystitis. Encouraged to follow up ASAP with PCP/Urology or to seek care at the ER if symptoms persist/worsen in the meantime. Patient stated understanding and agreed; no questions/concerns verbalized.

## 2024-12-26 ENCOUNTER — PHARMACY VISIT (OUTPATIENT)
Dept: PHARMACY | Facility: CLINIC | Age: 39
End: 2024-12-26
Payer: MEDICAID

## 2024-12-26 ENCOUNTER — TELEPHONE (OUTPATIENT)
Dept: PRIMARY CARE | Facility: CLINIC | Age: 39
End: 2024-12-26
Payer: COMMERCIAL

## 2024-12-26 DIAGNOSIS — G43.009 MIGRAINE WITHOUT AURA AND WITHOUT STATUS MIGRAINOSUS, NOT INTRACTABLE: ICD-10-CM

## 2024-12-26 PROCEDURE — RXMED WILLOW AMBULATORY MEDICATION CHARGE

## 2024-12-27 PROCEDURE — RXMED WILLOW AMBULATORY MEDICATION CHARGE

## 2025-01-02 ENCOUNTER — PHARMACY VISIT (OUTPATIENT)
Dept: PHARMACY | Facility: CLINIC | Age: 40
End: 2025-01-02
Payer: MEDICAID

## 2025-01-02 DIAGNOSIS — G43.009 MIGRAINE WITHOUT AURA AND WITHOUT STATUS MIGRAINOSUS, NOT INTRACTABLE: ICD-10-CM

## 2025-01-02 PROCEDURE — RXMED WILLOW AMBULATORY MEDICATION CHARGE

## 2025-01-02 RX ORDER — BUTALBITAL, ACETAMINOPHEN AND CAFFEINE 50; 325; 40 MG/1; MG/1; MG/1
1 TABLET ORAL EVERY 4 HOURS PRN
Qty: 30 TABLET | Refills: 1 | Status: SHIPPED | OUTPATIENT
Start: 2025-01-02

## 2025-01-05 ENCOUNTER — APPOINTMENT (OUTPATIENT)
Dept: RADIOLOGY | Facility: HOSPITAL | Age: 40
End: 2025-01-05
Payer: COMMERCIAL

## 2025-01-05 ENCOUNTER — HOSPITAL ENCOUNTER (EMERGENCY)
Facility: HOSPITAL | Age: 40
Discharge: OTHER INSTITUTION | End: 2025-01-05
Payer: COMMERCIAL

## 2025-01-05 VITALS
DIASTOLIC BLOOD PRESSURE: 73 MMHG | TEMPERATURE: 97.8 F | OXYGEN SATURATION: 97 % | WEIGHT: 183 LBS | BODY MASS INDEX: 27.74 KG/M2 | SYSTOLIC BLOOD PRESSURE: 102 MMHG | HEART RATE: 66 BPM | HEIGHT: 68 IN | RESPIRATION RATE: 17 BRPM

## 2025-01-05 DIAGNOSIS — R19.7 NAUSEA VOMITING AND DIARRHEA: ICD-10-CM

## 2025-01-05 DIAGNOSIS — Z53.21 ELOPED FROM EMERGENCY DEPARTMENT: Primary | ICD-10-CM

## 2025-01-05 DIAGNOSIS — K56.7 ILEUS (MULTI): ICD-10-CM

## 2025-01-05 DIAGNOSIS — R11.2 NAUSEA VOMITING AND DIARRHEA: ICD-10-CM

## 2025-01-05 LAB
ALBUMIN SERPL BCP-MCNC: 4.2 G/DL (ref 3.4–5)
ALP SERPL-CCNC: 72 U/L (ref 33–110)
ALT SERPL W P-5'-P-CCNC: 11 U/L (ref 7–45)
AMORPH CRY #/AREA UR COMP ASSIST: ABNORMAL /HPF
ANION GAP SERPL CALC-SCNC: 8 MMOL/L (ref 10–20)
APPEARANCE UR: ABNORMAL
AST SERPL W P-5'-P-CCNC: 14 U/L (ref 9–39)
BASOPHILS # BLD AUTO: 0.11 X10*3/UL (ref 0–0.1)
BASOPHILS NFR BLD AUTO: 1.9 %
BILIRUB DIRECT SERPL-MCNC: 0.1 MG/DL (ref 0–0.3)
BILIRUB SERPL-MCNC: 0.4 MG/DL (ref 0–1.2)
BILIRUB UR STRIP.AUTO-MCNC: NEGATIVE MG/DL
BUN SERPL-MCNC: 15 MG/DL (ref 6–23)
CALCIUM SERPL-MCNC: 9.4 MG/DL (ref 8.6–10.3)
CHLORIDE SERPL-SCNC: 104 MMOL/L (ref 98–107)
CO2 SERPL-SCNC: 31 MMOL/L (ref 21–32)
COLOR UR: ABNORMAL
CREAT SERPL-MCNC: 0.88 MG/DL (ref 0.5–1.05)
EGFRCR SERPLBLD CKD-EPI 2021: 86 ML/MIN/1.73M*2
EOSINOPHIL # BLD AUTO: 0.43 X10*3/UL (ref 0–0.7)
EOSINOPHIL NFR BLD AUTO: 7.3 %
ERYTHROCYTE [DISTWIDTH] IN BLOOD BY AUTOMATED COUNT: 12.3 % (ref 11.5–14.5)
FLUAV RNA RESP QL NAA+PROBE: NOT DETECTED
FLUBV RNA RESP QL NAA+PROBE: NOT DETECTED
GLUCOSE SERPL-MCNC: 78 MG/DL (ref 74–99)
GLUCOSE UR STRIP.AUTO-MCNC: NORMAL MG/DL
HCG UR QL IA.RAPID: NEGATIVE
HCT VFR BLD AUTO: 37.6 % (ref 36–46)
HGB BLD-MCNC: 12 G/DL (ref 12–16)
HOLD SPECIMEN: NORMAL
IMM GRANULOCYTES # BLD AUTO: 0 X10*3/UL (ref 0–0.7)
IMM GRANULOCYTES NFR BLD AUTO: 0 % (ref 0–0.9)
KETONES UR STRIP.AUTO-MCNC: NEGATIVE MG/DL
LEUKOCYTE ESTERASE UR QL STRIP.AUTO: NEGATIVE
LIPASE SERPL-CCNC: 30 U/L (ref 9–82)
LYMPHOCYTES # BLD AUTO: 2.24 X10*3/UL (ref 1.2–4.8)
LYMPHOCYTES NFR BLD AUTO: 38 %
MCH RBC QN AUTO: 30.7 PG (ref 26–34)
MCHC RBC AUTO-ENTMCNC: 31.9 G/DL (ref 32–36)
MCV RBC AUTO: 96 FL (ref 80–100)
MONOCYTES # BLD AUTO: 0.59 X10*3/UL (ref 0.1–1)
MONOCYTES NFR BLD AUTO: 10 %
MUCOUS THREADS #/AREA URNS AUTO: ABNORMAL /LPF
NEUTROPHILS # BLD AUTO: 2.52 X10*3/UL (ref 1.2–7.7)
NEUTROPHILS NFR BLD AUTO: 42.8 %
NITRITE UR QL STRIP.AUTO: NEGATIVE
NRBC BLD-RTO: 0 /100 WBCS (ref 0–0)
PH UR STRIP.AUTO: 7.5 [PH]
PLATELET # BLD AUTO: 304 X10*3/UL (ref 150–450)
POTASSIUM SERPL-SCNC: 4.1 MMOL/L (ref 3.5–5.3)
PROT SERPL-MCNC: 7.2 G/DL (ref 6.4–8.2)
PROT UR STRIP.AUTO-MCNC: ABNORMAL MG/DL
RBC # BLD AUTO: 3.91 X10*6/UL (ref 4–5.2)
RBC # UR STRIP.AUTO: NEGATIVE /UL
RBC #/AREA URNS AUTO: ABNORMAL /HPF
SARS-COV-2 RNA RESP QL NAA+PROBE: NOT DETECTED
SODIUM SERPL-SCNC: 139 MMOL/L (ref 136–145)
SP GR UR STRIP.AUTO: 1.02
SQUAMOUS #/AREA URNS AUTO: ABNORMAL /HPF
UROBILINOGEN UR STRIP.AUTO-MCNC: NORMAL MG/DL
WBC # BLD AUTO: 5.9 X10*3/UL (ref 4.4–11.3)
WBC #/AREA URNS AUTO: ABNORMAL /HPF
WBC CLUMPS #/AREA URNS AUTO: ABNORMAL /HPF

## 2025-01-05 PROCEDURE — 96361 HYDRATE IV INFUSION ADD-ON: CPT

## 2025-01-05 PROCEDURE — 83690 ASSAY OF LIPASE: CPT | Performed by: PHYSICIAN ASSISTANT

## 2025-01-05 PROCEDURE — 81025 URINE PREGNANCY TEST: CPT | Performed by: PHYSICIAN ASSISTANT

## 2025-01-05 PROCEDURE — 2500000004 HC RX 250 GENERAL PHARMACY W/ HCPCS (ALT 636 FOR OP/ED): Performed by: PHYSICIAN ASSISTANT

## 2025-01-05 PROCEDURE — 96374 THER/PROPH/DIAG INJ IV PUSH: CPT | Mod: 59

## 2025-01-05 PROCEDURE — 85025 COMPLETE CBC W/AUTO DIFF WBC: CPT | Performed by: PHYSICIAN ASSISTANT

## 2025-01-05 PROCEDURE — 81001 URINALYSIS AUTO W/SCOPE: CPT | Performed by: PHYSICIAN ASSISTANT

## 2025-01-05 PROCEDURE — 87086 URINE CULTURE/COLONY COUNT: CPT | Mod: SAMLAB | Performed by: PHYSICIAN ASSISTANT

## 2025-01-05 PROCEDURE — 74177 CT ABD & PELVIS W/CONTRAST: CPT

## 2025-01-05 PROCEDURE — 2550000001 HC RX 255 CONTRASTS: Performed by: PHYSICIAN ASSISTANT

## 2025-01-05 PROCEDURE — 80048 BASIC METABOLIC PNL TOTAL CA: CPT | Performed by: PHYSICIAN ASSISTANT

## 2025-01-05 PROCEDURE — 74177 CT ABD & PELVIS W/CONTRAST: CPT | Performed by: RADIOLOGY

## 2025-01-05 PROCEDURE — 96372 THER/PROPH/DIAG INJ SC/IM: CPT | Performed by: PHYSICIAN ASSISTANT

## 2025-01-05 PROCEDURE — 82248 BILIRUBIN DIRECT: CPT | Performed by: PHYSICIAN ASSISTANT

## 2025-01-05 PROCEDURE — 99285 EMERGENCY DEPT VISIT HI MDM: CPT | Mod: 25

## 2025-01-05 PROCEDURE — 36415 COLL VENOUS BLD VENIPUNCTURE: CPT | Performed by: PHYSICIAN ASSISTANT

## 2025-01-05 PROCEDURE — RXMED WILLOW AMBULATORY MEDICATION CHARGE

## 2025-01-05 PROCEDURE — 96375 TX/PRO/DX INJ NEW DRUG ADDON: CPT

## 2025-01-05 PROCEDURE — 87636 SARSCOV2 & INF A&B AMP PRB: CPT | Performed by: PHYSICIAN ASSISTANT

## 2025-01-05 RX ORDER — DICYCLOMINE HYDROCHLORIDE 10 MG/ML
20 INJECTION INTRAMUSCULAR ONCE
Status: COMPLETED | OUTPATIENT
Start: 2025-01-05 | End: 2025-01-05

## 2025-01-05 RX ORDER — KETOROLAC TROMETHAMINE 30 MG/ML
30 INJECTION, SOLUTION INTRAMUSCULAR; INTRAVENOUS ONCE
Status: COMPLETED | OUTPATIENT
Start: 2025-01-05 | End: 2025-01-05

## 2025-01-05 RX ORDER — PROCHLORPERAZINE EDISYLATE 5 MG/ML
10 INJECTION INTRAMUSCULAR; INTRAVENOUS ONCE
Status: COMPLETED | OUTPATIENT
Start: 2025-01-05 | End: 2025-01-05

## 2025-01-05 RX ORDER — ONDANSETRON HYDROCHLORIDE 2 MG/ML
4 INJECTION, SOLUTION INTRAVENOUS ONCE
Status: COMPLETED | OUTPATIENT
Start: 2025-01-05 | End: 2025-01-05

## 2025-01-05 RX ADMIN — PROCHLORPERAZINE EDISYLATE 10 MG: 5 INJECTION INTRAMUSCULAR; INTRAVENOUS at 13:19

## 2025-01-05 RX ADMIN — KETOROLAC TROMETHAMINE 30 MG: 30 INJECTION, SOLUTION INTRAMUSCULAR at 12:10

## 2025-01-05 RX ADMIN — IOHEXOL 69 ML: 350 INJECTION, SOLUTION INTRAVENOUS at 12:59

## 2025-01-05 RX ADMIN — DICYCLOMINE HYDROCHLORIDE 20 MG: 10 INJECTION INTRAMUSCULAR at 12:10

## 2025-01-05 RX ADMIN — ONDANSETRON 4 MG: 2 INJECTION INTRAMUSCULAR; INTRAVENOUS at 12:02

## 2025-01-05 RX ADMIN — SODIUM CHLORIDE 1000 ML: 9 INJECTION, SOLUTION INTRAVENOUS at 12:01

## 2025-01-05 ASSESSMENT — ENCOUNTER SYMPTOMS
SHORTNESS OF BREATH: 0
COLOR CHANGE: 0
FEVER: 0
HEMATURIA: 0
SORE THROAT: 0
ABDOMINAL PAIN: 1
BACK PAIN: 0
DYSURIA: 0
NAUSEA: 1
PALPITATIONS: 0
VOMITING: 1
SEIZURES: 0
DIARRHEA: 1
EYE PAIN: 0
CHILLS: 0
COUGH: 0
ARTHRALGIAS: 0

## 2025-01-05 ASSESSMENT — PAIN SCALES - GENERAL
PAINLEVEL_OUTOF10: 7
PAINLEVEL_OUTOF10: 5 - MODERATE PAIN

## 2025-01-05 ASSESSMENT — COLUMBIA-SUICIDE SEVERITY RATING SCALE - C-SSRS
1. IN THE PAST MONTH, HAVE YOU WISHED YOU WERE DEAD OR WISHED YOU COULD GO TO SLEEP AND NOT WAKE UP?: NO
6. HAVE YOU EVER DONE ANYTHING, STARTED TO DO ANYTHING, OR PREPARED TO DO ANYTHING TO END YOUR LIFE?: NO
2. HAVE YOU ACTUALLY HAD ANY THOUGHTS OF KILLING YOURSELF?: NO

## 2025-01-05 ASSESSMENT — PAIN - FUNCTIONAL ASSESSMENT: PAIN_FUNCTIONAL_ASSESSMENT: 0-10

## 2025-01-05 ASSESSMENT — PAIN DESCRIPTION - LOCATION: LOCATION: ABDOMEN

## 2025-01-05 NOTE — ED PROVIDER NOTES
Patient is a 39-year-old male who presents to the emergency room with a chief complaint of periumbilical abdominal pain.  She reports nausea, vomiting and diarrhea.  She states that her symptoms have been ongoing for the past 4 days.  She also complains of bodyaches.  She has been taking over-the-counter medication with no relief of her symptoms.  She states that the periumbilical abdominal pain is constant and she has tenderness to palpation.  She denies any fevers or chills.  No chest pain or shortness of breath.           Review of Systems   Constitutional:  Negative for chills and fever.   HENT:  Negative for ear pain and sore throat.    Eyes:  Negative for pain and visual disturbance.   Respiratory:  Negative for cough and shortness of breath.    Cardiovascular:  Negative for chest pain and palpitations.   Gastrointestinal:  Positive for abdominal pain, diarrhea, nausea and vomiting.   Genitourinary:  Negative for dysuria and hematuria.   Musculoskeletal:  Negative for arthralgias and back pain.   Skin:  Negative for color change and rash.   Neurological:  Negative for seizures and syncope.   All other systems reviewed and are negative.       Physical Exam  Vitals and nursing note reviewed.   Constitutional:       General: She is not in acute distress.     Appearance: She is well-developed.   HENT:      Head: Normocephalic and atraumatic.   Eyes:      Extraocular Movements: Extraocular movements intact.      Conjunctiva/sclera: Conjunctivae normal.      Pupils: Pupils are equal, round, and reactive to light.   Cardiovascular:      Rate and Rhythm: Normal rate and regular rhythm.      Heart sounds: No murmur heard.  Pulmonary:      Effort: Pulmonary effort is normal. No respiratory distress.      Breath sounds: Normal breath sounds. No stridor. No wheezing, rhonchi or rales.   Chest:      Chest wall: No tenderness.   Abdominal:      Palpations: Abdomen is soft.      Tenderness: There is abdominal tenderness in  the periumbilical area. There is no guarding or rebound. Negative signs include Gatica's sign, Rovsing's sign and McBurney's sign.      Hernia: No hernia is present.   Musculoskeletal:         General: No swelling.      Cervical back: Neck supple.   Skin:     General: Skin is warm and dry.      Capillary Refill: Capillary refill takes less than 2 seconds.   Neurological:      General: No focal deficit present.      Mental Status: She is alert.   Psychiatric:         Mood and Affect: Mood normal.          Labs Reviewed   CBC WITH AUTO DIFFERENTIAL - Abnormal       Result Value    WBC 5.9      nRBC 0.0      RBC 3.91 (*)     Hemoglobin 12.0      Hematocrit 37.6      MCV 96      MCH 30.7      MCHC 31.9 (*)     RDW 12.3      Platelets 304      Neutrophils % 42.8      Immature Granulocytes %, Automated 0.0      Lymphocytes % 38.0      Monocytes % 10.0      Eosinophils % 7.3      Basophils % 1.9      Neutrophils Absolute 2.52      Immature Granulocytes Absolute, Automated 0.00      Lymphocytes Absolute 2.24      Monocytes Absolute 0.59      Eosinophils Absolute 0.43      Basophils Absolute 0.11 (*)    BASIC METABOLIC PANEL - Abnormal    Glucose 78      Sodium 139      Potassium 4.1      Chloride 104      Bicarbonate 31      Anion Gap 8 (*)     Urea Nitrogen 15      Creatinine 0.88      eGFR 86      Calcium 9.4     URINALYSIS WITH REFLEX CULTURE AND MICROSCOPIC - Abnormal    Color, Urine Light-Yellow      Appearance, Urine Turbid (*)     Specific Gravity, Urine 1.023      pH, Urine 7.5      Protein, Urine 20 (TRACE)      Glucose, Urine Normal      Blood, Urine NEGATIVE      Ketones, Urine NEGATIVE      Bilirubin, Urine NEGATIVE      Urobilinogen, Urine Normal      Nitrite, Urine NEGATIVE      Leukocyte Esterase, Urine NEGATIVE     URINALYSIS MICROSCOPIC WITH REFLEX CULTURE - Abnormal    WBC, Urine 11-20 (*)     WBC Clumps, Urine OCCASIONAL      RBC, Urine 1-2      Squamous Epithelial Cells, Urine 1-9 (SPARSE)      Mucus,  Urine FEW      Amorphous Crystals, Urine 3+ (*)    HCG, URINE, QUALITATIVE - Normal    HCG, Urine NEGATIVE     LIPASE - Normal    Lipase 30      Narrative:     Venipuncture immediately after or during the administration of Metamizole may lead to falsely low results. Testing should be performed immediately prior to Metamizole dosing.   HEPATIC FUNCTION PANEL - Normal    Albumin 4.2      Bilirubin, Total 0.4      Bilirubin, Direct 0.1      Alkaline Phosphatase 72      ALT 11      AST 14      Total Protein 7.2     SARS-COV-2 AND INFLUENZA A/B PCR - Normal    Flu A Result Not Detected      Flu B Result Not Detected      Coronavirus 2019, PCR Not Detected      Narrative:     This assay has received FDA Emergency Use Authorization (EUA) and  is only authorized for the duration of time that circumstances exist to justify the authorization of the emergency use of in vitro diagnostic tests for the detection of SARS-CoV-2 virus and/or diagnosis of COVID-19 infection under section 564(b)(1) of the Act, 21 U.S.C. 360bbb-3(b)(1). Testing for SARS-CoV-2 is only recommended for patients who meet current clinical and/or epidemiological criteria as defined by federal, state, or local public health directives. This assay is an in vitro diagnostic nucleic acid amplification test for the qualitative detection of SARS-CoV-2, Influenza A, and Influenza B from nasopharyngeal specimens and has been validated for use at [facility]. Negative results do not preclude COVID-19 infections or Influenza A/B infections, and should not be used as the sole basis for diagnosis, treatment, or other management decisions. If Influenza A/B and RSV PCR results are negative, testing for Parainfluenza virus, Adenovirus and Metapneumovirus is routinely performed for [facility] [place] and intensive care inpatients, and is available on other patients by placing an add-on request.    URINE CULTURE   URINALYSIS WITH REFLEX CULTURE AND  MICROSCOPIC    Narrative:     The following orders were created for panel order Urinalysis with Reflex Culture and Microscopic.  Procedure                               Abnormality         Status                     ---------                               -----------         ------                     Urinalysis with Reflex C...[[id number]]  Abnormal            Final result               Extra Urine Gray Tube[[id number]]                            In process                   Please view results for these tests on the individual orders.   EXTRA URINE GRAY TUBE        CT abdomen pelvis w IV contrast   Final Result   1.  Mild small bowel ileus. Evaluation without oral contrast somewhat   limited. No signs of bowel obstruction or free air. Recommend   clinical correlation and follow-up to document resolution.   2. Post cholecystectomy changes. Surgical clips in the right   hemipelvis. Correlation with surgical history recommended. The   appendix is not seen in the current study but no secondary signs of   acute appendicitis identified.   3. Additional nonacute findings as detailed.             Signed by: Argentina Cunningham 1/5/2025 1:26 PM   Dictation workstation:   [id number]           Procedures     Medical Decision Making  Patient is a 39-year-old female who presents to the emergency room with a chief complaint of nausea, vomiting, diarrhea and periumbilical abdominal pain ongoing for approximately 4 days.  Vital signs are stable.  She is afebrile and nontoxic-appearing.  On physical examination she did have abdominal tenderness to her periumbilical region.  She was treated with IV fluids, IV Zofran, Compazine, and Bentyl.  CT scan of the abdomen pelvis performed which shows no evidence of obstruction but there is a small bowel ileus noted.  CT scan was otherwise unremarkable.  Urine shows 11-20 white blood cells, otherwise unremarkable.  Patient has a history of interstitial cystitis.  Urine will be sent for culture, no  indication for treating at this time.  Lab work is otherwise unremarkable.  I went to the patient's room to inform her of her findings and patient was no longer in the room.  Her personal belongings were also gone as well.  Registration noted that patient had eloped from the emergency department.  Patient still had her IV in place to our knowledge.  We attempted to contact the patient to inform her of her CT scan findings and also to check up on the patient.  There was no answer.  At this time we notified the police department.  They were able to find the patient at her house and she had already removed her IV.  Differential diagnosis includes but not limited to viral illness, acute appendicitis, colitis, diverticulitis, cholecystitis, bowel obstruction    Amount and/or Complexity of Data Reviewed  Labs: ordered. Decision-making details documented in ED Course.  Radiology: ordered. Decision-making details documented in ED Course.         Diagnoses as of 01/05/25 1444   Eloped from emergency department   Nausea vomiting and diarrhea   Ileus (Multi)                    Nelsy Alcocer PA-C  01/05/25 1449

## 2025-01-06 LAB — BACTERIA UR CULT: NORMAL

## 2025-01-08 ENCOUNTER — PHARMACY VISIT (OUTPATIENT)
Dept: PHARMACY | Facility: CLINIC | Age: 40
End: 2025-01-08
Payer: MEDICAID